# Patient Record
Sex: MALE | Race: WHITE | URBAN - METROPOLITAN AREA
[De-identification: names, ages, dates, MRNs, and addresses within clinical notes are randomized per-mention and may not be internally consistent; named-entity substitution may affect disease eponyms.]

---

## 2017-09-07 PROBLEM — N41.1 PROSTATITIS, CHRONIC: Status: ACTIVE | Noted: 2017-09-07

## 2017-11-03 ENCOUNTER — OFFICE VISIT (OUTPATIENT)
Dept: CARDIOLOGY CLINIC | Age: 77
End: 2017-11-03

## 2017-11-03 VITALS
WEIGHT: 185 LBS | BODY MASS INDEX: 25.06 KG/M2 | SYSTOLIC BLOOD PRESSURE: 119 MMHG | HEART RATE: 72 BPM | DIASTOLIC BLOOD PRESSURE: 67 MMHG | HEIGHT: 72 IN

## 2017-11-03 DIAGNOSIS — R60.9 EDEMA, UNSPECIFIED TYPE: ICD-10-CM

## 2017-11-03 DIAGNOSIS — I25.2 HISTORY OF MI (MYOCARDIAL INFARCTION): ICD-10-CM

## 2017-11-03 DIAGNOSIS — E78.5 HYPERLIPIDEMIA, UNSPECIFIED HYPERLIPIDEMIA TYPE: ICD-10-CM

## 2017-11-03 DIAGNOSIS — Z95.5 HISTORY OF CORONARY ARTERY STENT PLACEMENT: ICD-10-CM

## 2017-11-03 DIAGNOSIS — I25.10 CORONARY ARTERY DISEASE INVOLVING NATIVE CORONARY ARTERY OF NATIVE HEART WITHOUT ANGINA PECTORIS: Primary | ICD-10-CM

## 2017-11-03 DIAGNOSIS — E03.9 HYPOTHYROIDISM, UNSPECIFIED TYPE: ICD-10-CM

## 2017-11-03 NOTE — MR AVS SNAPSHOT
Visit Information Date & Time Provider Department Dept. Phone Encounter #  
 11/3/2017 11:45 AM Baldemar Miller MD [facility] [phone] Follow-up Instructions Return in about 2 months (around 1/3/2018), or if symptoms worsen or fail to improve, for post test.  
  
Your Appointments 4/16/2018  9:30 AM  
Nurse Visit with [email or web address] [address] [place] [address] Drive ([address]) Appt Note: PSA  
 [address] [facility] [address] [address]  
  
    
 4/30/2018  9:45 AM  
ESTABLISHED PATIENT with Belia Hendrix MD  
[facility] of [facility] [address]) Appt Note: 6 MTHS FLOW PVR UA PSA PRIOR  
 [facility] [address] [facility] Upcoming Health Maintenance Date Due DTaP/Tdap/Td series (1 - Tdap) 6/6/1961 ZOSTER VACCINE AGE 60> 4/6/2000 GLAUCOMA SCREENING Q2Y 6/6/2005 MEDICARE YEARLY EXAM 6/6/2005 INFLUENZA AGE 9 TO ADULT 8/1/2017 Pneumococcal 65+ High/Highest Risk (2 of 2 - PPSV23) 11/2/2017 Allergies as of 11/3/2017  Review Complete On: 11/3/2017 By: Baldemar Miller MD  
 No Known Allergies Current Immunizations  Never Reviewed No immunizations on file. Not reviewed this visit You Were Diagnosed With   
  
 Codes Comments Coronary artery disease involving native coronary artery of native heart without angina pectoris    -  Primary ICD-10-CM: I25.10 ICD-9-CM: 414.01 per pt stress test good in [facility] in  fall(usually october) of 2016 
 
get report of stress test from [facility] or PCP Edema, unspecified type     ICD-10-CM: R60.9 ICD-9-CM: 782.3 b/l legs- chk DVT, ECHO Teds if DVT neg Hypothyroidism, unspecified type     ICD-10-CM: E03.9 ICD-9-CM: 244.9 F/u PCP History of coronary artery stent placement     ICD-10-CM: Z95.5 ICD-9-CM: V45.82  d/c plavix, continue asa;  
2008 approx History of MI (myocardial infarction)     ICD-10-CM: I25.2 ICD-9-CM: 751  in [facility] Hyperlipidemia, unspecified hyperlipidemia type     ICD-10-CM: E78.5 ICD-9-CM: 272.4 Vitals BP Pulse Height(growth percentile) Weight(growth percentile) BMI Smoking Status 119/67 72 6' (1.829 m) 185 lb (83.9 kg) 25.09 kg/m2 Former Smoker Vitals History BMI and BSA Data Body Mass Index Body Surface Area 25.09 kg/m 2 2.06 m 2 Preferred Pharmacy Pharmacy Name Phone [address] [facility], [address] [facility] [phone] Your Updated Medication List  
  
   
This list is accurate as of: 11/3/17 12:35 PM.  Always use your most recent med list.  
  
  
  
  
 aspirin delayed-release 81 mg tablet Take 81 mg by mouth. atorvastatin 40 mg tablet Commonly known as:  LIPITOR Take 40 mg by mouth. Comp Stocking,Knee,Regular,Sml Misc 2 Box by Does Not Apply route daily. Use while upright  Indications: edema  
  
 docusate sodium 100 mg capsule Commonly known as:  Ethyl Hoit Take 100 mg by mouth.  
  
 levothyroxine 50 mcg tablet Commonly known as:  SYNTHROID Take 1 Tab by mouth.  
  
 losartan 50 mg tablet Commonly known as:  COZAAR TK 1 T PO  ONCE D  
  
 omeprazole 40 mg capsule Commonly known as:  PRILOSEC TK ONE C PO  D Prescriptions Sent to Pharmacy Refills Comp Stocking,Knee,Regular,Sml misc 0 Si Box by Does Not Apply route daily. Use while upright  Indications: edema Class: Normal  
 Pharmacy: At [facility] [address] [facility] Ph #: [phone] Route: Does Not Apply We Performed the Following AMB POC EKG ROUTINE W/ 12 LEADS, INTER & REP [37695 CPT(R)] Follow-up Instructions Return in about 2 months (around 1/3/2018), or if symptoms worsen or fail to improve, for post test.  
  
To-Do List   
 Around 2017 Lab:  HEPATIC FUNCTION PANEL Around 2017 Lab:  LIPID PANEL Around 2017 Cardiac Services:  2D ECHO COMPLETE ADULT (TTE)   
  
 11/10/2017 Imaging:  DUPLEX LOWER EXT VENOUS RIGHT Patient Instructions Medications Discontinued During This Encounter Medication Reason  ibuprofen (MOTRIN) 600 mg tablet Not A Current Medication  clopidogrel (PLAVIX) 75 mg tab Therapy Completed Orders Placed This Encounter  DUPLEX LOWER EXT VENOUS RIGHT Standing Status:   Future Standing Expiration Date:   12/3/2018  LIPID PANEL Standing Status:   Future Standing Expiration Date:   2/3/2018  
 HEPATIC FUNCTION PANEL Standing Status:   Future Standing Expiration Date:   2/3/2018  2D ECHO COMPLETE ADULT (TTE) Standing Status:   Future Standing Expiration Date:   2018 Order Specific Question:   Reason for Exam: Answer:   edema,  
 AMB POC EKG ROUTINE W/ 12 LEADS, INTER & REP Order Specific Question:   Reason for Exam: Answer:   hypertension  Comp Stocking,Knee,Regular,Sml misc Si Box by Does Not Apply route daily. Use while upright  Indications: edema Dispense:  2 Box Refill:  0 Leg and Ankle Edema: Care Instructions Your Care Instructions Swelling in the legs, ankles, and feet is called edema. It is common after you sit or stand for a while. Long plane flights or car rides often cause swelling in the legs and feet. You may also have swelling if you have to stand for long periods of time at your job. Problems with the veins in the legs (varicose veins) and changes in hormones can also cause swelling.  Sometimes the swelling in the ankles and feet is caused by a more serious problem, such as heart failure, infection, blood clots, or liver or kidney disease. Follow-up care is a key part of your treatment and safety. Be sure to make and go to all appointments, and call your doctor if you are having problems. It's also a good idea to know your test results and keep a list of the medicines you take. How can you care for yourself at home? · If your doctor gave you medicine, take it as prescribed. Call your doctor if you think you are having a problem with your medicine. · Whenever you are resting, raise your legs up. Try to keep the swollen area higher than the level of your heart. · Take breaks from standing or sitting in one position. ¨ Walk around to increase the blood flow in your lower legs. ¨ Move your feet and ankles often while you stand, or tighten and relax your leg muscles. · Wear support stockings. Put them on in the morning, before swelling gets worse. · Eat a balanced diet. Lose weight if you need to. · Limit the amount of salt (sodium) in your diet. Salt holds fluid in the body and may increase swelling. When should you call for help? Call 911 anytime you think you may need emergency care. For example, call if: 
? · You have symptoms of a blood clot in your lung (called a pulmonary embolism). These may include: 
¨ Sudden chest pain. ¨ Trouble breathing. ¨ Coughing up blood. ?Call your doctor now or seek immediate medical care if: 
? · You have signs of a blood clot, such as: 
¨ Pain in your calf, back of the knee, thigh, or groin. ¨ Redness and swelling in your leg or groin. ? · You have symptoms of infection, such as: 
¨ Increased pain, swelling, warmth, or redness. ¨ Red streaks or pus. ¨ A fever. ? Watch closely for changes in your health, and be sure to contact your doctor if: 
? · Your swelling is getting worse. ? · You have new or worsening pain in your legs. ? · You do not get better as expected. Where can you learn more? Go to http://gordo-parish.info/. Enter O546 in the search box to learn more about \"Leg and Ankle Edema: Care Instructions. \" Current as of: March 20, 2017 Content Version: 11.4 © 8664-6890 [employer]. Care instructions adapted under license by [employer] (which disclaims liability or warranty for this information). If you have questions about a medical condition or this instruction, always ask your healthcare professional. [address] any warranty or liability for your use of this information. Introducing Joss Gonzales! Gerson Zhang introduces [employer] patient portal. Now you can access parts of your medical record, email your doctor's office, and request medication refills online. 1. In your internet browser, go to [email or web address] 2. Click on the First Time User? Click Here link in the Sign In box. You will see the New Member Sign Up page. 3. Enter your [employer] Access Code exactly as it appears below. You will not need to use this code after youve completed the sign-up process. If you do not sign up before the expiration date, you must request a new code. · [employer] Access Code: [email or web address] Expires: 12/6/2017  3:45 PM 
 
4. Enter the last four digits of your Social Security Number (xxxx) and Date of Birth (mm/dd/yyyy) as indicated and click Submit. You will be taken to the next sign-up page. 5. Create a [employer] ID. This will be your [employer] login ID and cannot be changed, so think of one that is secure and easy to remember. 6. Create a [employer] password. You can change your password at any time. 7. Enter your Password Reset Question and Answer. This can be used at a later time if you forget your password. 8. Enter your e-mail address. You will receive e-mail notification when new information is available in [address] 9. Click Sign Up. You can now view and download portions of your medical record. 10. Click the Download Summary menu link to download a portable copy of your medical information. If you have questions, please visit the Frequently Asked Questions section of the [employer]e website. Remember, [employer] is NOT to be used for urgent needs. For medical emergencies, dial 911. Now available from your iPhone and Android! Please provide this summary of care documentation to your next provider. Your primary care clinician is listed as Vladislav Griffiths. If you have any questions after today's visit, please call [phone].

## 2017-11-03 NOTE — LETTER
Gus Conradapril 
1940 
 
11/3/2017 Dear Cindi Alas MD 
 
I had the pleasure of evaluating  Mr. Bhumika Joy in office today. Below are the relevant portions of my assessment and plan of care. ICD-10-CM ICD-9-CM 1. 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  
 per pt stress test good in [place] in  fall(usually october) of 2016 
 
get report of stress test from [place] or PCP 2. Edema, unspecified type R60.9 782.3 2D ECHO COMPLETE ADULT (TTE) DUPLEX LOWER EXT VENOUS RIGHT Comp Stocking,Knee,Regular,Sml misc  
 b/l legs- chk DVT, ECHO Teds if DVT neg 3. Hypothyroidism, unspecified type E03.9 244.9 F/u PCP 4. History of coronary artery stent placement Z95.5 V45.82 2D ECHO COMPLETE ADULT (TTE)  
 11/17 d/c plavix, continue asa;  
2008 approx 5. History of MI (myocardial infarction) I25.2 12 2D ECHO COMPLETE ADULT (TTE) 2007 in [place]  
6. Hyperlipidemia, unspecified hyperlipidemia type E78.5 272.4 LIPID PANEL  
   HEPATIC FUNCTION PANEL Current Outpatient Prescriptions Medication Sig Dispense Refill  Comp Stocking,Knee,Regular,Sml misc 2 Box by Does Not Apply route daily. Use while upright  Indications: edema 2 Box 0  
 levothyroxine (SYNTHROID) 50 mcg tablet Take 1 Tab by mouth.  omeprazole (PRILOSEC) 40 mg capsule TK ONE C PO  D  3  
 atorvastatin (LIPITOR) 40 mg tablet Take 40 mg by mouth.  losartan (COZAAR) 50 mg tablet TK 1 T PO  ONCE D  3  
 aspirin delayed-release 81 mg tablet Take 81 mg by mouth.  docusate sodium (COLACE) 100 mg capsule Take 100 mg by mouth. Orders Placed This Encounter  DUPLEX LOWER EXT VENOUS RIGHT Standing Status:   Future Standing Expiration Date:   12/3/2018  LIPID PANEL Standing Status:   Future   Standing Expiration Date:   2/3/2018  
 HEPATIC FUNCTION PANEL  
 Standing Status:   Future Standing Expiration Date:   2/3/2018  2D ECHO COMPLETE ADULT (TTE) Standing Status:   Future Standing Expiration Date:   2018 Order Specific Question:   Reason for Exam: Answer:   edema,  
 AMB POC EKG ROUTINE W/ 12 LEADS, INTER & REP Order Specific Question:   Reason for Exam: Answer:   hypertension  Comp Stocking,Knee,Regular,Sml misc Si Box by Does Not Apply route daily. Use while upright  Indications: edema Dispense:  2 Box Refill:  0 If you have questions, please do not hesitate to call me. I look forward to following Mr. Jarrett Pearson along with you. Sincerely, Cherrie Daniels MD

## 2017-11-03 NOTE — PROGRESS NOTES
HISTORY OF PRESENT ILLNESS  Sanford Weaver is a 68 y.o. male. HPI Comments: Patient denies significant chest pain, SOB, palpitations, dizziness      New Patient   The history is provided by the patient. Pertinent negatives include no chest pain, no headaches and no shortness of breath. Cholesterol Problem   The history is provided by the medical records and patient. Pertinent negatives include no chest pain, no headaches and no shortness of breath. Hypertension   The history is provided by the medical records and patient. Pertinent negatives include no chest pain, no headaches and no shortness of breath. Thyroid Problem   The history is provided by the patient. Pertinent negatives include no chest pain, no headaches and no shortness of breath. Leg Swelling   The history is provided by the patient. This is a chronic problem. The current episode started more than 1 week ago. The problem occurs daily. Pertinent negatives include no chest pain, no headaches and no shortness of breath. The symptoms are aggravated by standing. The symptoms are relieved by sleep. Review of Systems   Constitutional: Negative for chills, fever, malaise/fatigue and weight loss. HENT: Negative for nosebleeds. Eyes: Negative for discharge. Respiratory: Negative for cough, shortness of breath and wheezing. Cardiovascular: Positive for leg swelling. Negative for chest pain, palpitations, orthopnea, claudication and PND. Gastrointestinal: Negative for diarrhea, nausea and vomiting. Genitourinary: Negative for dysuria and hematuria. Musculoskeletal: Negative for joint pain. Skin: Negative for rash. Neurological: Negative for dizziness, seizures, loss of consciousness and headaches. Endo/Heme/Allergies: Negative for polydipsia. Does not bruise/bleed easily. Psychiatric/Behavioral: Negative for depression and substance abuse. The patient does not have insomnia.       No Known Allergies    Past Medical History:   Diagnosis Date    Cerebrovascular accident (CVA) ([facility] [address] late 80s    TIA    Coronary arteriosclerosis     Essential hypertension     Gastroesophageal reflux disease     Heart disease     Hyperlipidemia     Hypothyroid     Myocardial infarction     Prostate cancer ([facility] [address]     1 core vahid 6- pt currently on AS    Renal disease     Renal stones        Family History   Problem Relation Age of Onset    Heart Attack Neg Hx     Stroke Neg Hx        Social History   Substance Use Topics    Smoking status: Former Smoker     Types: Cigarettes     Quit date: 9/7/2006    Smokeless tobacco: Never Used    Alcohol use No        Current Outpatient Prescriptions   Medication Sig    levothyroxine (SYNTHROID) 50 mcg tablet Take 1 Tab by mouth.  omeprazole (PRILOSEC) 40 mg capsule TK ONE C PO  D    atorvastatin (LIPITOR) 40 mg tablet Take 40 mg by mouth.  losartan (COZAAR) 50 mg tablet TK 1 T PO  ONCE D    aspirin delayed-release 81 mg tablet Take 81 mg by mouth.  docusate sodium (COLACE) 100 mg capsule Take 100 mg by mouth. No current facility-administered medications for this visit. Past Surgical History:   Procedure Laterality Date    HX CORONARY STENT PLACEMENT  2007    HX HEART CATHETERIZATION      HX HERNIA REPAIR      HX OTHER SURGICAL  90s    b/l renal stents    HX UROLOGICAL      cystoscopy with bilateral stent placement       Visit Vitals    /67    Pulse 72    Ht 6' (1.829 m)    Wt 83.9 kg (185 lb)    BMI 25.09 kg/m2       Diagnostic Studies:  I have reviewed the relevant tests done on the patient and show as follows  EKG tracings reviewed by me today. No flowsheet data found. Mr. Cayla Ely has a reminder for a \"due or due soon\" health maintenance. I have asked that he contact his primary care provider for follow-up on this health maintenance. Physical Exam   Constitutional: He is oriented to person, place, and time.  He appears well-developed and well-nourished. No distress. HENT:   Head: Normocephalic and atraumatic. Mouth/Throat: Normal dentition. Eyes: Right eye exhibits no discharge. Left eye exhibits no discharge. No scleral icterus. Neck: Neck supple. No JVD present. Carotid bruit is not present. No thyromegaly present. Cardiovascular: Normal rate, regular rhythm, S1 normal, S2 normal, normal heart sounds and intact distal pulses. Exam reveals decreased pulses. Exam reveals no gallop and no friction rub. No murmur heard. Pulmonary/Chest: Effort normal and breath sounds normal. He has no wheezes. He has no rales. Abdominal: Soft. He exhibits no mass. There is no tenderness. Musculoskeletal: He exhibits edema (1+). Lymphadenopathy:        Right cervical: No superficial cervical adenopathy present. Left cervical: No superficial cervical adenopathy present. Neurological: He is alert and oriented to person, place, and time. Skin: Skin is warm and dry. No rash noted. Psychiatric: He has a normal mood and affect. His behavior is normal.       ASSESSMENT and PLAN      Diagnoses and all orders for this visit:    1. Coronary artery disease involving native coronary artery of native heart without angina pectoris  Comments:  per pt stress test good in [place] in  fall(usually october) of 2016    get report of stress test from [place] or PCP  Orders:  -     AMB POC EKG ROUTINE W/ 12 LEADS, INTER & REP    2. Edema, unspecified type  Comments:  b/l legs- chk DVT, ECHO  Teds if DVT neg  Orders:  -     2D ECHO COMPLETE ADULT (TTE); Future  -     DUPLEX LOWER EXT VENOUS RIGHT; Future  -     Comp Stocking,Knee,Regular,Sml misc; 2 Box by Does Not Apply route daily. Use while upright  Indications: edema    3. Hypothyroidism, unspecified type  Comments:  F/u PCP      4. History of coronary artery stent placement  Comments:  11/17 d/c plavix, continue asa;   2008 approx  Orders:  -     2D ECHO COMPLETE ADULT (TTE); Future    5.  History of MI (myocardial infarction)  Comments:  2007 in [place]  Orders:  -     2D ECHO COMPLETE ADULT (TTE); Future    6. Hyperlipidemia, unspecified hyperlipidemia type  -     LIPID PANEL; Future  -     HEPATIC FUNCTION PANEL; Future        Pertinent laboratory and test data reviewed and discussed with patient.   See patient instructions also for other medical advice given    Medications Discontinued During This Encounter   Medication Reason    ibuprofen (MOTRIN) 600 mg tablet Not A Current Medication    clopidogrel (PLAVIX) 75 mg tab Therapy Completed       Follow-up Disposition:  Return in about 2 months (around 1/3/2018), or if symptoms worsen or fail to improve, for post test.

## 2017-11-03 NOTE — PATIENT INSTRUCTIONS
Medications Discontinued During This Encounter   Medication Reason    ibuprofen (MOTRIN) 600 mg tablet Not A Current Medication    clopidogrel (PLAVIX) 75 mg tab Therapy Completed       Orders Placed This Encounter    DUPLEX LOWER EXT VENOUS RIGHT     Standing Status:   Future     Standing Expiration Date:   12/3/2018    LIPID PANEL     Standing Status:   Future     Standing Expiration Date:   2/3/2018    HEPATIC FUNCTION PANEL     Standing Status:   Future     Standing Expiration Date:   2/3/2018    2D ECHO COMPLETE ADULT (TTE)     Standing Status:   Future     Standing Expiration Date:   2018     Order Specific Question:   Reason for Exam:     Answer:   edema,    AMB POC EKG ROUTINE W/ 12 LEADS, INTER & REP     Order Specific Question:   Reason for Exam:     Answer:   hypertension    Comp Stocking,Knee,Regular,Sml misc     Si Box by Does Not Apply route daily. Use while upright  Indications: edema     Dispense:  2 Box     Refill:  0          Leg and Ankle Edema: Care Instructions  Your Care Instructions  Swelling in the legs, ankles, and feet is called edema. It is common after you sit or stand for a while. Long plane flights or car rides often cause swelling in the legs and feet. You may also have swelling if you have to stand for long periods of time at your job. Problems with the veins in the legs (varicose veins) and changes in hormones can also cause swelling. Sometimes the swelling in the ankles and feet is caused by a more serious problem, such as heart failure, infection, blood clots, or liver or kidney disease. Follow-up care is a key part of your treatment and safety. Be sure to make and go to all appointments, and call your doctor if you are having problems. It's also a good idea to know your test results and keep a list of the medicines you take. How can you care for yourself at home? · If your doctor gave you medicine, take it as prescribed.  Call your doctor if you think you are having a problem with your medicine. · Whenever you are resting, raise your legs up. Try to keep the swollen area higher than the level of your heart. · Take breaks from standing or sitting in one position. ¨ Walk around to increase the blood flow in your lower legs. ¨ Move your feet and ankles often while you stand, or tighten and relax your leg muscles. · Wear support stockings. Put them on in the morning, before swelling gets worse. · Eat a balanced diet. Lose weight if you need to. · Limit the amount of salt (sodium) in your diet. Salt holds fluid in the body and may increase swelling. When should you call for help? Call 911 anytime you think you may need emergency care. For example, call if:  ? · You have symptoms of a blood clot in your lung (called a pulmonary embolism). These may include:  ¨ Sudden chest pain. ¨ Trouble breathing. ¨ Coughing up blood. ?Call your doctor now or seek immediate medical care if:  ? · You have signs of a blood clot, such as:  ¨ Pain in your calf, back of the knee, thigh, or groin. ¨ Redness and swelling in your leg or groin. ? · You have symptoms of infection, such as:  ¨ Increased pain, swelling, warmth, or redness. ¨ Red streaks or pus. ¨ A fever. ? Watch closely for changes in your health, and be sure to contact your doctor if:  ? · Your swelling is getting worse. ? · You have new or worsening pain in your legs. ? · You do not get better as expected. Where can you learn more? Go to http://gordo-parish.info/. Enter F867 in the search box to learn more about \"Leg and Ankle Edema: Care Instructions. \"  Current as of: March 20, 2017  Content Version: 11.4  © 4857-5628 [employer]. Care instructions adapted under license by el? (which disclaims liability or warranty for this information).  If you have questions about a medical condition or this instruction, always ask your healthcare professional. opinions.h, [employer]orporated disclaims any warranty or liability for your use of this information. Patient is scheduled to have a Venous doppler at [place] [address] on 11/21/17 @ 8:30.

## 2017-11-28 ENCOUNTER — TELEPHONE (OUTPATIENT)
Dept: CARDIOLOGY CLINIC | Age: 77
End: 2017-11-28

## 2017-11-28 DIAGNOSIS — R60.9 EDEMA, UNSPECIFIED TYPE: ICD-10-CM

## 2017-11-28 NOTE — TELEPHONE ENCOUNTER
Dr. Martha Deluca called to have patient cleared for Ureteroscopy with general anesthesia with Dr. Liv Lambert on 12/6/17.  Please advise

## 2017-11-28 NOTE — TELEPHONE ENCOUNTER
Get echo asap  If last stress test not available from [place], get a new nuclear stress test also

## 2017-11-29 NOTE — TELEPHONE ENCOUNTER
Can't say confidently his surgical risks, as pt is new to me and has had CAD and remote PCI. He is refusing testing for CAD evaluation. However he was hemodynamically stable when sen as new patient for the first time. Patient as well as surgeon will need to understand that while going through [place].

## 2017-12-05 NOTE — TELEPHONE ENCOUNTER
Dr. Graciela Benitez called to discuss notes regarding surgical clearance. Surgery has been cancelled. He voices understanding and acceptance of this advice and will call back if any further questions or concerns.

## 2017-12-11 ENCOUNTER — TELEPHONE (OUTPATIENT)
Dept: CARDIOLOGY CLINIC | Age: 77
End: 2017-12-11

## 2017-12-11 NOTE — TELEPHONE ENCOUNTER
If swelling is significant, patient may go to emergency room or urgent care center. Otherwise bring him for an office visit for appropriate decision.

## 2017-12-11 NOTE — TELEPHONE ENCOUNTER
----- Message from Magdaleno Goodpasture sent at 12/11/2017  9:03 AM EST -----  Regarding: LEG SWELLING/PAIN   Contact: [phone]  Patient would like a return call concerning the leg swelling and pain; have questions.

## 2017-12-11 NOTE — TELEPHONE ENCOUNTER
Patient's friend tam to discuss bilateral leg swelling with pain, swelling continues even with compression stockings on, pt has no shortness of breath and does not weigh daily. Next office visit 1/2018. Please advise.

## 2017-12-11 NOTE — TELEPHONE ENCOUNTER
Patient called and spoke with friend, he will see Dr. Zeb Chanel on 12/26/17 at [address] [place] [address] for leg swelling. She voices understanding and acceptance of this advice and will call back if any further questions or concerns.

## 2017-12-26 ENCOUNTER — OFFICE VISIT (OUTPATIENT)
Dept: CARDIOLOGY CLINIC | Age: 77
End: 2017-12-26

## 2017-12-26 VITALS
WEIGHT: 194 LBS | BODY MASS INDEX: 26.28 KG/M2 | SYSTOLIC BLOOD PRESSURE: 129 MMHG | DIASTOLIC BLOOD PRESSURE: 68 MMHG | HEART RATE: 75 BPM | HEIGHT: 72 IN

## 2017-12-26 DIAGNOSIS — I50.9 ACUTE ON CHRONIC CONGESTIVE HEART FAILURE, UNSPECIFIED CONGESTIVE HEART FAILURE TYPE: Primary | ICD-10-CM

## 2017-12-26 DIAGNOSIS — I25.2 HISTORY OF MI (MYOCARDIAL INFARCTION): ICD-10-CM

## 2017-12-26 DIAGNOSIS — I10 ESSENTIAL HYPERTENSION: ICD-10-CM

## 2017-12-26 DIAGNOSIS — I25.10 CORONARY ARTERIOSCLEROSIS: ICD-10-CM

## 2017-12-26 DIAGNOSIS — Z95.5 HISTORY OF CORONARY ARTERY STENT PLACEMENT: ICD-10-CM

## 2017-12-26 RX ORDER — FUROSEMIDE 40 MG/1
40 TABLET ORAL
Qty: 30 TAB | Refills: 1 | Status: SHIPPED | OUTPATIENT
Start: 2017-12-26 | End: 2018-01-08 | Stop reason: SDUPTHER

## 2017-12-26 RX ORDER — LOSARTAN POTASSIUM 50 MG/1
25 TABLET ORAL DAILY
Qty: 30 TAB | Refills: 3
Start: 2017-12-26 | End: 2018-01-08 | Stop reason: SDUPTHER

## 2017-12-26 NOTE — PATIENT INSTRUCTIONS
Medications Discontinued During This Encounter   Medication Reason    losartan (COZAAR) 50 mg tablet Reorder       Orders Placed This Encounter    METABOLIC PANEL, BASIC     Standing Status:   Future     Standing Expiration Date:   3/28/2018    SCHEDULE NUCLEAR STUDY     exercise    2D ECHO COMPLETE ADULT (TTE)     Standing Status:   Future     Standing Expiration Date:   6/24/2018     Order Specific Question:   Reason for Exam:     Answer:   CHF    furosemide (LASIX) 40 mg tablet     Sig: Take 1 Tab by mouth daily as needed. Dispense:  30 Tab     Refill:  1    losartan (COZAAR) 50 mg tablet     Sig: Take 0.5 Tabs by mouth daily. Indications: Chronic Heart Failure     Dispense:  30 Tab     Refill:  3          Avoiding Triggers With Heart Failure: Care Instructions  Your Care Instructions    Triggers are anything that make your heart failure flare up. A flare-up is also called \"sudden heart failure\" or \"acute heart failure. \" When you have a flare-up, fluid builds up in your lungs, and you have problems breathing. You might need to go to the hospital. By watching for changes in your condition and avoiding triggers, you can prevent heart failure flare-ups. Follow-up care is a key part of your treatment and safety. Be sure to make and go to all appointments, and call your doctor if you are having problems. It's also a good idea to know your test results and keep a list of the medicines you take. How can you care for yourself at home? Watch for changes in your weight and condition  · Weigh yourself without clothing at the same time each day. Record your weight. Call your doctor if you have sudden weight gain, such as more than 2 to 3 pounds in a day or 5 pounds in a week. (Your doctor may suggest a different range of weight gain.) A sudden weight gain may mean that your heart failure is getting worse. · Keep a daily record of your symptoms.  Write down any changes in how you feel, such as new shortness of breath, cough, or problems eating. Also record if your ankles are more swollen than usual and if you feel more tired than usual. Note anything that you ate or did that could have triggered these changes. Limit sodium  Sodium causes your body to hold on to extra water. This may cause your heart failure symptoms to get worse. People get most of their sodium from processed foods. Fast food and restaurant meals also tend to be very high in sodium. · Your doctor may suggest that you limit sodium to 2,000 milligrams (mg) a day or less. That is less than 1 teaspoon of salt a day, including all the salt you eat in cooking or in packaged foods. · Read food labels on cans and food packages. They tell you how much sodium you get in one serving. Check the serving size. If you eat more than one serving, you are getting more sodium. · Be aware that sodium can come in forms other than salt, including monosodium glutamate (MSG), sodium citrate, and sodium bicarbonate (baking soda). MSG is often added to Asian food. You can sometimes ask for food without MSG or salt. · Slowly reducing salt will help you adjust to the taste. Take the salt shaker off the table. · Flavor your food with garlic, lemon juice, onion, vinegar, herbs, and spices instead of salt. Do not use soy sauce, steak sauce, onion salt, garlic salt, mustard, or ketchup on your food, unless it is labeled \"low-sodium\" or \"low-salt. \"  · Make your own salad dressings, sauces, and ketchup without adding salt. · Use fresh or frozen ingredients, instead of canned ones, whenever you can. Choose low-sodium canned goods. · Eat less processed food and food from restaurants, including fast food. Exercise as directed  Moderate, regular exercise is very good for your heart. It improves your blood flow and helps control your weight. But too much exercise can stress your heart and cause a heart failure flare-up.   · Check with your doctor before you start an exercise program.  · Walking is an easy way to get exercise. Start out slowly. Gradually increase the length and pace of your walk. Swimming, riding a bike, and using a treadmill are also good forms of exercise. · When you exercise, watch for signs that your heart is working too hard. You are pushing yourself too hard if you cannot talk while you are exercising. If you become short of breath or dizzy or have chest pain, stop, sit down, and rest.  · Do not exercise when you do not feel well. Take medicines correctly  · Take your medicines exactly as prescribed. Call your doctor if you think you are having a problem with your medicine. · Make a list of all the medicines you take. Include those prescribed to you by other doctors and any over-the-counter medicines, vitamins, or supplements you take. Take this list with you when you go to any doctor. · Take your medicines at the same time every day. It may help you to post a list of all the medicines you take every day and what time of day you take them. · Make taking your medicine as simple as you can. Plan times to take your medicines when you are doing other things, such as eating a meal or getting ready for bed. This will make it easier to remember to take your medicines. · Get organized. Use helpful tools, such as daily or weekly pill containers. When should you call for help? Call 911 if you have symptoms of sudden heart failure such as:  ? · You have severe trouble breathing. ? · You cough up pink, foamy mucus. ? · You have a new irregular or rapid heartbeat. ?Call your doctor now or seek immediate medical care if:  ? · You have new or increased shortness of breath. ? · You are dizzy or lightheaded, or you feel like you may faint. ? · You have sudden weight gain, such as more than 2 to 3 pounds in a day or 5 pounds in a week. (Your doctor may suggest a different range of weight gain.)   ? · You have increased swelling in your legs, ankles, or feet. ? · You are suddenly so tired or weak that you cannot do your usual activities. ? Watch closely for changes in your health, and be sure to contact your doctor if you develop new symptoms. Where can you learn more? Go to http://[facility].info/. Enter G857 in the search box to learn more about \"Avoiding Triggers With Heart Failure: Care Instructions. \"  Current as of: September 21, 2016  Content Version: 11.4  © 2446-7752 "[employer]". Care instructions adapted under license by [employer] (which disclaims liability or warranty for this information). If you have questions about a medical condition or this instruction, always ask your healthcare professional. [address] any warranty or liability for your use of this information.

## 2017-12-26 NOTE — PROGRESS NOTES
1. Have you been to the ER, urgent care clinic since your last visit? Hospitalized since your last visit? No    2. Have you seen or consulted any other health care providers outside of the [address] [place] [address] since your last visit? Include any pap smears or colon screening. Yes Where: PCP     3. Since your last visit, have you had any of the following symptoms? .           4. Have you had any blood work, X-rays or cardiac testing? No            5.  Where do you normally have your labs drawn? PCP Office    6. Do you need any refills today?    No

## 2017-12-26 NOTE — PROGRESS NOTES
HISTORY OF PRESENT ILLNESS  Karime Villatoro is a 68 y.o. male. HPI Comments: Patient denies significant chest pain, palpitations, dizziness      Leg Swelling   The history is provided by the patient. This is a chronic problem. The current episode started more than 1 week ago. The problem occurs daily. The problem has been gradually worsening (few weeks). Associated symptoms include shortness of breath. Pertinent negatives include no chest pain and no headaches. The symptoms are aggravated by standing. The symptoms are relieved by sleep. Cholesterol Problem   The history is provided by the medical records and patient. Associated symptoms include shortness of breath. Pertinent negatives include no chest pain and no headaches. Hypertension   The history is provided by the medical records and patient. Associated symptoms include shortness of breath. Pertinent negatives include no chest pain and no headaches. Shortness of Breath   The history is provided by the patient. This is a new problem. The problem occurs intermittently. The current episode started more than 1 week ago (11/17). Associated symptoms include leg swelling. Pertinent negatives include no fever, no headaches, no cough, no wheezing, no PND, no orthopnea, no chest pain, no vomiting, no rash and no claudication. The problem's precipitants include exercise (1 flight). Review of Systems   Constitutional: Negative for chills, fever, malaise/fatigue and weight loss. HENT: Negative for nosebleeds. Eyes: Negative for discharge. Respiratory: Positive for shortness of breath. Negative for cough and wheezing. Cardiovascular: Positive for leg swelling. Negative for chest pain, palpitations, orthopnea, claudication and PND. Gastrointestinal: Negative for diarrhea, nausea and vomiting. Genitourinary: Negative for dysuria and hematuria. Musculoskeletal: Negative for joint pain. Skin: Negative for rash.    Neurological: Negative for dizziness, seizures, loss of consciousness and headaches. Endo/Heme/Allergies: Negative for polydipsia. Does not bruise/bleed easily. Psychiatric/Behavioral: Negative for depression and substance abuse. The patient does not have insomnia. No Known Allergies    Past Medical History:   Diagnosis Date    [facility] late 80s    TIA    Coronary arteriosclerosis     Essential hypertension     Gastroesophageal reflux disease     Heart disease     Hyperlipidemia     Hypothyroid     Myocardial infarction     Prostate cancer ([facility]     1 core vahid 6- pt currently on AS    Renal disease     Renal stones        Family History   Problem Relation Age of Onset    Heart Attack Neg Hx     Stroke Neg Hx        Social History   Substance Use Topics    Smoking status: Former Smoker     Types: Cigarettes     Quit date: 9/7/2006    Smokeless tobacco: Never Used    Alcohol use No        Current Outpatient Prescriptions   Medication Sig    Comp Stocking,Knee,Regular,Sml misc 2 Box by Does Not Apply route daily. Use while upright  Indications: edema    levothyroxine (SYNTHROID) 50 mcg tablet Take 1 Tab by mouth.  omeprazole (PRILOSEC) 40 mg capsule TK ONE C PO  D    atorvastatin (LIPITOR) 40 mg tablet Take 40 mg by mouth.  losartan (COZAAR) 50 mg tablet TK 1 T PO  ONCE D    aspirin delayed-release 81 mg tablet Take 81 mg by mouth.  docusate sodium (COLACE) 100 mg capsule Take 100 mg by mouth. No current facility-administered medications for this visit.          Past Surgical History:   Procedure Laterality Date    HX CORONARY STENT PLACEMENT  2007    HX HEART CATHETERIZATION      HX HERNIA REPAIR      HX OTHER SURGICAL  90s    b/l renal stents    HX UROLOGICAL      cystoscopy with bilateral stent placement       Visit Vitals    /68    Pulse 75    Ht 6' (1.829 m)    Wt 194 lb (88 kg)    BMI 26.31 kg/m2       Diagnostic Studies:  I have reviewed the relevant tests done on the patient and show as follows  EKG tracings reviewed by me today. CARDIOLOGY STUDIES 11/21/2017   Doppler US Vascular Result DVT PVL negative   Some recent data might be hidden       Mr. Danni Rasheed has a reminder for a \"due or due soon\" health maintenance. I have asked that he contact his primary care provider for follow-up on this health maintenance. Physical Exam   Constitutional: He is oriented to person, place, and time. He appears well-developed and well-nourished. No distress. HENT:   Head: Normocephalic and atraumatic. Mouth/Throat: Normal dentition. Eyes: Right eye exhibits no discharge. Left eye exhibits no discharge. No scleral icterus. Neck: Neck supple. No JVD present. Carotid bruit is not present. No thyromegaly present. Cardiovascular: Normal rate, regular rhythm, S1 normal, S2 normal, normal heart sounds and intact distal pulses. Exam reveals decreased pulses. Exam reveals no gallop and no friction rub. No murmur heard. Pulmonary/Chest: Effort normal and breath sounds normal. He has no wheezes. He has no rales. Abdominal: Soft. He exhibits no mass. There is no tenderness. Musculoskeletal: He exhibits edema (2+). Lymphadenopathy:        Right cervical: No superficial cervical adenopathy present. Left cervical: No superficial cervical adenopathy present. Neurological: He is alert and oriented to person, place, and time. Skin: Skin is warm and dry. No rash noted. Psychiatric: He has a normal mood and affect. His behavior is normal.       ASSESSMENT and PLAN        Diagnoses and all orders for this visit:    1. Acute on chronic congestive heart failure, unspecified congestive heart failure type ([address]  Comments:  12/17 worsening edema; eval EF, ischemia  Orders:  -     2D ECHO COMPLETE ADULT (TTE); Future  -     SCHEDULE NUCLEAR STUDY  -     METABOLIC PANEL, BASIC; Future  -     furosemide (LASIX) 40 mg tablet; Take 1 Tab by mouth daily as needed.   - losartan (COZAAR) 50 mg tablet; Take 0.5 Tabs by mouth daily. Indications: Chronic Heart Failure    2. Coronary arteriosclerosis  Comments:  H/o PCI 2007      3. Essential hypertension  Comments:  controlled    Orders:  -     2D ECHO COMPLETE ADULT (TTE); Future  -     losartan (COZAAR) 50 mg tablet; Take 0.5 Tabs by mouth daily. Indications: Chronic Heart Failure    4. History of coronary artery stent placement    5. History of MI (myocardial infarction)        Pertinent laboratory and test data reviewed and discussed with patient.   See patient instructions also for other medical advice given    Medications Discontinued During This Encounter   Medication Reason    losartan (COZAAR) 50 mg tablet Reorder       Follow-up Disposition:  Return in about 2 weeks (around 1/9/2018), or if symptoms worsen or fail to improve, for same day post test.

## 2017-12-26 NOTE — LETTER
Yareli Richard 
1940 12/26/2017 Dear Elidia Whittington MD 
 
I had the pleasure of evaluating  Mr. Tiff Villatoro in office today. Below are the relevant portions of my assessment and plan of care. ICD-10-CM ICD-9-CM 1. Acute on chronic congestive heart failure, unspecified congestive heart failure type (HCC) I50.9 428.0 2D ECHO COMPLETE ADULT (TTE) SCHEDULE NUCLEAR STUDY METABOLIC PANEL, BASIC  
   furosemide (LASIX) 40 mg tablet  
   losartan (COZAAR) 50 mg tablet 12/17 worsening edema; eval EF, ischemia 2. Coronary arteriosclerosis I25.10 414.00 H/o PCI 2007 3. Essential hypertension I10 401.9 2D ECHO COMPLETE ADULT (TTE)  
   losartan (COZAAR) 50 mg tablet  
 controlled 4. History of coronary artery stent placement Z95.5 V45.82   
5. History of MI (myocardial infarction) I25.2 412 Current Outpatient Prescriptions Medication Sig Dispense Refill  furosemide (LASIX) 40 mg tablet Take 1 Tab by mouth daily as needed. 30 Tab 1  
 losartan (COZAAR) 50 mg tablet Take 0.5 Tabs by mouth daily. Indications: Chronic Heart Failure 30 Tab 3  
 Comp Stocking,Knee,Regular,Sml misc 2 Box by Does Not Apply route daily. Use while upright  Indications: edema 2 Box 0  
 levothyroxine (SYNTHROID) 50 mcg tablet Take 1 Tab by mouth.  omeprazole (PRILOSEC) 40 mg capsule TK ONE C PO  D  3  
 atorvastatin (LIPITOR) 40 mg tablet Take 40 mg by mouth.  aspirin delayed-release 81 mg tablet Take 81 mg by mouth.  docusate sodium (COLACE) 100 mg capsule Take 100 mg by mouth. Orders Placed This Encounter  METABOLIC PANEL, BASIC Standing Status:   Future Standing Expiration Date:   3/28/2018  SCHEDULE NUCLEAR STUDY  
  exercise  2D ECHO COMPLETE ADULT (TTE) Standing Status:   Future Standing Expiration Date:   6/24/2018 Order Specific Question:   Reason for Exam: Answer:   CHF  furosemide (LASIX) 40 mg tablet Sig: Take 1 Tab by mouth daily as needed. Dispense:  30 Tab Refill:  1  
 losartan (COZAAR) 50 mg tablet Sig: Take 0.5 Tabs by mouth daily. Indications: Chronic Heart Failure Dispense:  30 Tab Refill:  3 If you have questions, please do not hesitate to call me. I look forward to following Mr. Yaritza Powell along with you. Sincerely, Edson Ledezma MD

## 2017-12-26 NOTE — MR AVS SNAPSHOT
Visit Information Date & Time Provider Department Dept. Phone Encounter #  
 12/26/2017  8:45 AM Eve Deleon MD [facility] [id number] [phone] Follow-up Instructions Return in about 2 weeks (around 1/9/2018), or if symptoms worsen or fail to improve, for same day post test.  
  
Your Appointments 1/8/2018  8:00 AM  
PROCEDURE with CA NUC [facility] ([address] [place] [address]) Appt Note: Est/Echo/Ray/Same day follow up  
 [address] [facility] [address]  
  
   
 [address] [id number] [address] [address]  
  
    
 1/8/2018  8:30 AM  
PROCEDURE with CA ECHO [facility] ([address] [place] [address]) Appt Note: echo/est/same day follow up  
 [address] [facility] [address]  
  
   
 [address] [id number] [address] [address]  
  
    
 1/8/2018  9:30 AM  
ESTABLISHED PATIENT with Eve Deleon MD  
[facility] ([address] [place] [address]) Appt Note: 2 month post venous doppler/Lipid/LFT  
 [address] [facility] [address]  
  
   
 [address] [id number] [address] [address]  
  
    
 4/16/2018  9:30 AM  
Nurse Visit with [facility] [address] [place] [address] ([address] [place] [address]) Appt Note: PSA  
 2000 [facility] [address] [address]  
  
    
 4/30/2018  9:45 AM  
ESTABLISHED PATIENT with Unique Arenas MD  
[facility] of [address] [place] [address] Drive [address] [place] [address]) Appt Note: 6 MTHS FLOW PVR UA PSA PRIOR  
 2000 [facility] [address] Upcoming Health Maintenance Date Due DTaP/Tdap/Td series (1 - Tdap) 6/6/1961 ZOSTER VACCINE AGE 60> 4/6/2000 GLAUCOMA SCREENING Q2Y 6/6/2005 MEDICARE YEARLY EXAM 6/6/2005 Influenza Age 5 to Adult 8/1/2017 Pneumococcal 65+ High/Highest Risk (2 of 2 - PPSV23) 11/2/2017 Allergies as of 12/26/2017  Review Complete On: 12/26/2017 By: Bettye Osullivan MD  
 No Known Allergies Current Immunizations  Never Reviewed No immunizations on file. Not reviewed this visit You Were Diagnosed With   
  
 Codes Comments Acute on chronic congestive heart failure, unspecified congestive heart failure type ([facility] [address]    -  Primary ICD-10-CM: I50.9 ICD-9-CM: 428.0 12/17 worsening edema; eval EF, ischemia Coronary arteriosclerosis     ICD-10-CM: I25.10 ICD-9-CM: 414.00 H/o PCI 2007 Essential hypertension     ICD-10-CM: I10 
ICD-9-CM: 401.9 controlled History of coronary artery stent placement     ICD-10-CM: Z95.5 ICD-9-CM: V45.82 History of MI (myocardial infarction)     ICD-10-CM: I25.2 ICD-9-CM: 284 Vitals BP Pulse Height(growth percentile) Weight(growth percentile) BMI Smoking Status 129/68 75 6' (1.829 m) 194 lb (88 kg) 26.31 kg/m2 Former Smoker Vitals History BMI and BSA Data Body Mass Index Body Surface Area  
 26.31 kg/m 2 2.11 m 2 Preferred Pharmacy Pharmacy Name Phone [facility], [address] [place] [address] [phone] Your Updated Medication List  
  
   
This list is accurate as of: 12/26/17  9:50 AM.  Always use your most recent med list.  
  
  
  
  
 aspirin delayed-release 81 mg tablet Take 81 mg by mouth. atorvastatin 40 mg tablet Commonly known as:  LIPITOR Take 40 mg by mouth. Comp Stocking,Knee,Regular,Sml Misc 2 Box by Does Not Apply route daily. Use while upright  Indications: edema  
  
 docusate sodium 100 mg capsule Commonly known as:  Maretta Florian Take 100 mg by mouth. furosemide 40 mg tablet Commonly known as:  LASIX Take 1 Tab by mouth daily as needed. levothyroxine 50 mcg tablet Commonly known as:  SYNTHROID Take 1 Tab by mouth.  
  
 losartan 50 mg tablet Commonly known as:  COZAAR  
 Take 0.5 Tabs by mouth daily. Indications: Chronic Heart Failure  
  
 omeprazole 40 mg capsule Commonly known as:  PRILOSEC TK ONE C PO  D Prescriptions Sent to Pharmacy Refills  
 furosemide (LASIX) 40 mg tablet 1 Sig: Take 1 Tab by mouth daily as needed. Class: Normal  
 Pharmacy: [facility] [address] [facility] #: [phone] Route: Oral  
  
We Performed the Following SCHEDULE NUCLEAR STUDY [CNX6938 Custom] Comments:  
 exercise Follow-up Instructions Return in about 2 weeks (around 1/9/2018), or if symptoms worsen or fail to improve, for same day post test.  
  
To-Do List   
 Around 12/29/2017 Cardiac Services:  2D ECHO COMPLETE ADULT (TTE) Around 01/02/2018 Lab:  METABOLIC PANEL, BASIC Patient Instructions Medications Discontinued During This Encounter Medication Reason  losartan (COZAAR) 50 mg tablet Reorder Orders Placed This Encounter  METABOLIC PANEL, BASIC Standing Status:   Future Standing Expiration Date:   3/28/2018  SCHEDULE NUCLEAR STUDY  
  exercise  2D ECHO COMPLETE ADULT (TTE) Standing Status:   Future Standing Expiration Date:   6/24/2018 Order Specific Question:   Reason for Exam: Answer:   CHF  furosemide (LASIX) 40 mg tablet Sig: Take 1 Tab by mouth daily as needed. Dispense:  30 Tab Refill:  1  
 losartan (COZAAR) 50 mg tablet Sig: Take 0.5 Tabs by mouth daily. Indications: Chronic Heart Failure Dispense:  30 Tab Refill:  3 Avoiding Triggers With Heart Failure: Care Instructions Your Care Instructions Triggers are anything that make your heart failure flare up. A flare-up is also called \"sudden heart failure\" or \"acute heart failure. \" When you have a flare-up, fluid builds up in your lungs, and you have problems breathing. You might need to go to the hospital. By watching for changes in your condition and avoiding triggers, you can prevent heart failure flare-ups. Follow-up care is a key part of your treatment and safety. Be sure to make and go to all appointments, and call your doctor if you are having problems. It's also a good idea to know your test results and keep a list of the medicines you take. How can you care for yourself at home? Watch for changes in your weight and condition · Weigh yourself without clothing at the same time each day. Record your weight. Call your doctor if you have sudden weight gain, such as more than 2 to 3 pounds in a day or 5 pounds in a week. (Your doctor may suggest a different range of weight gain.) A sudden weight gain may mean that your heart failure is getting worse. · Keep a daily record of your symptoms. Write down any changes in how you feel, such as new shortness of breath, cough, or problems eating. Also record if your ankles are more swollen than usual and if you feel more tired than usual. Note anything that you ate or did that could have triggered these changes. Limit sodium Sodium causes your body to hold on to extra water. This may cause your heart failure symptoms to get worse. People get most of their sodium from processed foods. Fast food and restaurant meals also tend to be very high in sodium. · Your doctor may suggest that you limit sodium to 2,000 milligrams (mg) a day or less. That is less than 1 teaspoon of salt a day, including all the salt you eat in cooking or in packaged foods. · Read food labels on cans and food packages. They tell you how much sodium you get in one serving. Check the serving size. If you eat more than one serving, you are getting more sodium.  
· Be aware that sodium can come in forms other than salt, including monosodium glutamate (MSG), sodium citrate, and sodium bicarbonate (baking soda). MSG is often added to Asian food. You can sometimes ask for food without MSG or salt. · Slowly reducing salt will help you adjust to the taste. Take the salt shaker off the table. · Flavor your food with garlic, lemon juice, onion, vinegar, herbs, and spices instead of salt. Do not use soy sauce, steak sauce, onion salt, garlic salt, mustard, or ketchup on your food, unless it is labeled \"low-sodium\" or \"low-salt. \" 
· Make your own salad dressings, sauces, and ketchup without adding salt. · Use fresh or frozen ingredients, instead of canned ones, whenever you can. Choose low-sodium canned goods. · Eat less processed food and food from restaurants, including fast food. Exercise as directed Moderate, regular exercise is very good for your heart. It improves your blood flow and helps control your weight. But too much exercise can stress your heart and cause a heart failure flare-up. · Check with your doctor before you start an exercise program. 
· Walking is an easy way to get exercise. Start out slowly. Gradually increase the length and pace of your walk. Swimming, riding a bike, and using a treadmill are also good forms of exercise. · When you exercise, watch for signs that your heart is working too hard. You are pushing yourself too hard if you cannot talk while you are exercising. If you become short of breath or dizzy or have chest pain, stop, sit down, and rest. 
· Do not exercise when you do not feel well. Take medicines correctly · Take your medicines exactly as prescribed. Call your doctor if you think you are having a problem with your medicine. · Make a list of all the medicines you take. Include those prescribed to you by other doctors and any over-the-counter medicines, vitamins, or supplements you take. Take this list with you when you go to any doctor. · Take your medicines at the same time every day.  It may help you to post a list of all the medicines you take every day and what time of day you take them. · Make taking your medicine as simple as you can. Plan times to take your medicines when you are doing other things, such as eating a meal or getting ready for bed. This will make it easier to remember to take your medicines. · Get organized. Use helpful tools, such as daily or weekly pill containers. When should you call for help? Call 911 if you have symptoms of sudden heart failure such as: 
? · You have severe trouble breathing. ? · You cough up pink, foamy mucus. ? · You have a new irregular or rapid heartbeat. ?Call your doctor now or seek immediate medical care if: 
? · You have new or increased shortness of breath. ? · You are dizzy or lightheaded, or you feel like you may faint. ? · You have sudden weight gain, such as more than 2 to 3 pounds in a day or 5 pounds in a week. (Your doctor may suggest a different range of weight gain.) ? · You have increased swelling in your legs, ankles, or feet. ? · You are suddenly so tired or weak that you cannot do your usual activities. ? Watch closely for changes in your health, and be sure to contact your doctor if you develop new symptoms. Where can you learn more? Go to http://gordo-parish.info/. Enter H088 in the search box to learn more about \"Avoiding Triggers With Heart Failure: Care Instructions. \" Current as of: September 21, 2016 Content Version: 11.4 © 8392-5436 [employer]. Care instructions adapted under license by [employer] (which disclaims liability or warranty for this information). If you have questions about a medical condition or this instruction, always ask your healthcare professional. [address] any warranty or liability for your use of this information. Introducing [facility]!    
 Marilyn Cristina introduces [employer] patient portal. Now you can access parts of your medical record, email your doctor's office, and request medication refills online. 1. In your internet browser, go to [email or web address] 2. Click on the First Time User? Click Here link in the Sign In box. You will see the New Member Sign Up page. 3. Enter your Seam[employer] Receipts Access Code exactly as it appears below. You will not need to use this code after youve completed the sign-up process. If you do not sign up before the expiration date, you must request a new code. · Se[employer] Receipts Access Code: [email or web address] Expires: 3/14/2018 11:15 AM 
 
4. Enter the last four digits of your Social Security Number (xxxx) and Date of Birth (mm/dd/yyyy) as indicated and click Submit. You will be taken to the next sign-up page. 5. Create a Seamless Receipts ID. This will be your Seamless Receipts login ID and cannot be changed, so think of one that is secure and easy to remember. 6. Create a [employer]ceipts password. You can change your password at any time. 7. Enter your Password Reset Question and Answer. This can be used at a later time if you forget your password. 8. Enter your e-mail address. You will receive e-mail notification when new information is available in [address] 9. Click Sign Up. You can now view and download portions of your medical record. 10. Click the Download Summary menu link to download a portable copy of your medical information. If you have questions, please visit the Frequently Asked Questions section of the [employer]ceipt[employer] website. Remember, [employer]ceipts is NOT to be used for urgent needs. For medical emergencies, dial 911. Now available from your iPhone and Android! Please provide this summary of care documentation to your next provider. Your primary care clinician is listed as Vladislav Griffiths. If you have any questions after today's visit, please call [phone].

## 2018-01-08 ENCOUNTER — OFFICE VISIT (OUTPATIENT)
Dept: CARDIOLOGY CLINIC | Age: 78
End: 2018-01-08

## 2018-01-08 ENCOUNTER — CLINICAL SUPPORT (OUTPATIENT)
Dept: CARDIOLOGY CLINIC | Age: 78
End: 2018-01-08

## 2018-01-08 VITALS
HEART RATE: 86 BPM | WEIGHT: 194 LBS | SYSTOLIC BLOOD PRESSURE: 143 MMHG | BODY MASS INDEX: 26.28 KG/M2 | HEIGHT: 72 IN | DIASTOLIC BLOOD PRESSURE: 72 MMHG

## 2018-01-08 DIAGNOSIS — I10 ESSENTIAL HYPERTENSION: ICD-10-CM

## 2018-01-08 DIAGNOSIS — I25.10 CORONARY ARTERIOSCLEROSIS: ICD-10-CM

## 2018-01-08 DIAGNOSIS — I50.33 ACUTE ON CHRONIC DIASTOLIC CONGESTIVE HEART FAILURE (HCC): Primary | ICD-10-CM

## 2018-01-08 DIAGNOSIS — Z95.5 HISTORY OF CORONARY ARTERY STENT PLACEMENT: ICD-10-CM

## 2018-01-08 DIAGNOSIS — I50.9 ACUTE ON CHRONIC CONGESTIVE HEART FAILURE, UNSPECIFIED CONGESTIVE HEART FAILURE TYPE: ICD-10-CM

## 2018-01-08 DIAGNOSIS — I25.2 HISTORY OF MI (MYOCARDIAL INFARCTION): ICD-10-CM

## 2018-01-08 DIAGNOSIS — I51.9 HEART DISEASE: Primary | ICD-10-CM

## 2018-01-08 RX ORDER — FUROSEMIDE 40 MG/1
40 TABLET ORAL
Qty: 90 TAB | Refills: 1 | Status: SHIPPED | OUTPATIENT
Start: 2018-01-08

## 2018-01-08 RX ORDER — LOSARTAN POTASSIUM 50 MG/1
50 TABLET ORAL DAILY
Qty: 90 TAB | Refills: 1 | Status: SHIPPED | OUTPATIENT
Start: 2018-01-08 | End: 2018-08-17 | Stop reason: SDUPTHER

## 2018-01-08 NOTE — LETTER
Luke  
1940 1/8/2018 Dear Joe Farmer MD 
 
I had the pleasure of evaluating  Mr. Blanche Quiros in office today. Below are the relevant portions of my assessment and plan of care. ICD-10-CM ICD-9-CM 1. Acute on chronic diastolic congestive heart failure (HCC) I50.33 428.33   
  428.0   
 1/18 symptom better but wt same yet; incr losartan 2. Coronary arteriosclerosis I25.10 414.00 H/o PCI 2007 3. Essential hypertension I10 401.9 losartan (COZAAR) 50 mg tablet 1/18 incr losartan;  
get labs from PCP 4. Acute on chronic congestive heart failure, unspecified congestive heart failure type (HCC) I50.9 428.0 losartan (COZAAR) 50 mg tablet  
   furosemide (LASIX) 40 mg tablet 12/17 worsening edema; eval EF, ischemia 5. History of coronary artery stent placement Z95.5 V45.82   
6. History of MI (myocardial infarction) I25.2 412 Current Outpatient Prescriptions Medication Sig Dispense Refill  losartan (COZAAR) 50 mg tablet Take 1 Tab by mouth daily. Indications: Chronic Heart Failure 90 Tab 1  
 furosemide (LASIX) 40 mg tablet Take 1 Tab by mouth daily as needed. 90 Tab 1  Comp Stocking,Knee,Regular,Sml misc 2 Box by Does Not Apply route daily. Use while upright  Indications: edema 2 Box 0  
 levothyroxine (SYNTHROID) 50 mcg tablet Take 1 Tab by mouth.  omeprazole (PRILOSEC) 40 mg capsule TK ONE C PO  D  3  
 atorvastatin (LIPITOR) 40 mg tablet Take 40 mg by mouth.  aspirin delayed-release 81 mg tablet Take 81 mg by mouth.  docusate sodium (COLACE) 100 mg capsule Take 100 mg by mouth. Orders Placed This Encounter  losartan (COZAAR) 50 mg tablet Sig: Take 1 Tab by mouth daily. Indications: Chronic Heart Failure Dispense:  90 Tab Refill:  1  
 furosemide (LASIX) 40 mg tablet Sig: Take 1 Tab by mouth daily as needed. Dispense:  90 Tab Refill:  1 If you have questions, please do not hesitate to call me. I look forward to following Mr. Christine Larson along with you. Sincerely, Christiano Han MD

## 2018-01-08 NOTE — PROGRESS NOTES
1. Have you been to the ER, urgent care clinic since your last visit? Hospitalized since your last visit? No    2. Have you seen or consulted any other health care providers outside of the [address] [place] [address] since your last visit? Include any pap smears or colon screening. No     3. Since your last visit, have you had any of the following symptoms? shortness of breath. 4.  Have you had any blood work, X-rays or cardiac testing? Yes Where: PCP Reason for visit: Labs             5.  Where do you normally have your labs drawn? PCP    6. Do you need any refills today?    No

## 2018-01-08 NOTE — PROGRESS NOTES
HISTORY OF PRESENT ILLNESS  Saturnino Guevara is a 68 y.o. male. HPI Comments: Patient denies significant chest pain, palpitations, dizziness      Cholesterol Problem   The history is provided by the medical records and patient. Associated symptoms include shortness of breath. Pertinent negatives include no chest pain and no headaches. Shortness of Breath   The history is provided by the patient. This is a new problem. The problem occurs intermittently. The current episode started more than 1 week ago (11/17). The problem has not changed since onset. Associated symptoms include leg swelling. Pertinent negatives include no fever, no headaches, no cough, no wheezing, no PND, no orthopnea, no chest pain, no vomiting, no rash and no claudication. The problem's precipitants include exercise (1 flight). Hypertension   The history is provided by the medical records and patient. Associated symptoms include shortness of breath. Pertinent negatives include no chest pain and no headaches. Leg Swelling   The history is provided by the patient. This is a chronic problem. The current episode started more than 1 week ago. The problem occurs daily. The problem has been gradually improving. Associated symptoms include shortness of breath. Pertinent negatives include no chest pain and no headaches. The symptoms are aggravated by standing. The symptoms are relieved by sleep and medications. Review of Systems   Constitutional: Negative for chills, fever, malaise/fatigue and weight loss. HENT: Negative for nosebleeds. Eyes: Negative for discharge. Respiratory: Positive for shortness of breath. Negative for cough and wheezing. Cardiovascular: Positive for leg swelling. Negative for chest pain, palpitations, orthopnea, claudication and PND. Gastrointestinal: Negative for diarrhea, nausea and vomiting. Genitourinary: Negative for dysuria and hematuria. Musculoskeletal: Negative for joint pain.    Skin: Negative for rash. Neurological: Negative for dizziness, seizures, loss of consciousness and headaches. Endo/Heme/Allergies: Negative for polydipsia. Does not bruise/bleed easily. Psychiatric/Behavioral: Negative for depression and substance abuse. The patient does not have insomnia. No Known Allergies    Past Medical History:   Diagnosis Date    Cerebrovascular accident (CVA) ([facility] [address] late 80s    TIA    Coronary arteriosclerosis     Essential hypertension     Gastroesophageal reflux disease     Heart disease     Hyperlipidemia     Hypothyroid     Myocardial infarction     [facility]     1 core vahid 6- pt currently on AS    Renal disease     Renal stones        Family History   Problem Relation Age of Onset    Heart Attack Neg Hx     Stroke Neg Hx        Social History   Substance Use Topics    Smoking status: Former Smoker     Types: Cigarettes     Quit date: 9/7/2006    Smokeless tobacco: Never Used    Alcohol use No        Current Outpatient Prescriptions   Medication Sig    furosemide (LASIX) 40 mg tablet Take 1 Tab by mouth daily as needed.  losartan (COZAAR) 50 mg tablet Take 0.5 Tabs by mouth daily. Indications: Chronic Heart Failure    Comp Stocking,Knee,Regular,Sml misc 2 Box by Does Not Apply route daily. Use while upright  Indications: edema    levothyroxine (SYNTHROID) 50 mcg tablet Take 1 Tab by mouth.  omeprazole (PRILOSEC) 40 mg capsule TK ONE C PO  D    atorvastatin (LIPITOR) 40 mg tablet Take 40 mg by mouth.  aspirin delayed-release 81 mg tablet Take 81 mg by mouth.  docusate sodium (COLACE) 100 mg capsule Take 100 mg by mouth. No current facility-administered medications for this visit.          Past Surgical History:   Procedure Laterality Date    HX CORONARY STENT PLACEMENT  2007    HX HEART CATHETERIZATION      HX HERNIA REPAIR      HX OTHER SURGICAL  90s    b/l renal stents    HX UROLOGICAL      cystoscopy with bilateral stent placement       Visit Vitals    /72    Pulse 86    Ht 6' (1.829 m)    Wt 194 lb (88 kg)    BMI 26.31 kg/m2       Diagnostic Studies:  I have reviewed the relevant tests done on the patient and show as follows  EKG tracings reviewed by me today. CARDIOLOGY STUDIES 1/8/2018 11/21/2017   Exercise Nuclear Stress Test Result 65%EF, fixed inf defect -   Doppler US Vascular Result - DVT PVL negative   Some recent data might be hidden   1/18 ECHO  SUMMARY:  Left ventricle: Systolic function was at the lower limits of normal.  Ejection fraction was estimated in the range of 50 % to 55 %. No obvious  wall motion abnormalities identified in the views obtained. There was mild  concentric hypertrophy. Doppler parameters were consistent with abnormal  left ventricular relaxation (grade 1 diastolic dysfunction). Mitral valve: There was moderate annular calcification. There was mild  regurgitation. Aortic valve: There was mild regurgitation. Tricuspid valve: There was mild regurgitation. Pulmonic valve: There was mild regurgitation. Mr. Joe Flores has a reminder for a \"due or due soon\" health maintenance. I have asked that he contact his primary care provider for follow-up on this health maintenance. Physical Exam   Constitutional: He is oriented to person, place, and time. He appears well-developed and well-nourished. No distress. HENT:   Head: Normocephalic and atraumatic. Mouth/Throat: Normal dentition. Eyes: Right eye exhibits no discharge. Left eye exhibits no discharge. No scleral icterus. Neck: Neck supple. No JVD present. Carotid bruit is not present. No thyromegaly present. Cardiovascular: Normal rate, regular rhythm, S1 normal, S2 normal, normal heart sounds and intact distal pulses. Exam reveals decreased pulses. Exam reveals no gallop and no friction rub. No murmur heard. Pulmonary/Chest: Effort normal and breath sounds normal. He has no wheezes. He has no rales.    Abdominal: Soft. He exhibits no mass. There is no tenderness. Musculoskeletal: He exhibits edema (2+). Lymphadenopathy:        Right cervical: No superficial cervical adenopathy present. Left cervical: No superficial cervical adenopathy present. Neurological: He is alert and oriented to person, place, and time. Skin: Skin is warm and dry. No rash noted. Psychiatric: He has a normal mood and affect. His behavior is normal.       ASSESSMENT and PLAN          Diagnoses and all orders for this visit:    1. Acute on chronic diastolic congestive heart failure ([facility] [address]  Comments:  1/18 symptom better but wt same yet; incr losartan    2. Coronary arteriosclerosis  Comments:  H/o PCI 2007      3. Essential hypertension  Comments:  1/18 incr losartan;   get labs from PCP  Orders:  -     losartan (COZAAR) 50 mg tablet; Take 1 Tab by mouth daily. Indications: Chronic Heart Failure    4. Acute on chronic congestive heart failure, unspecified congestive heart failure type ([facility] [address]  Comments:  12/17 worsening edema; eval EF, ischemia  Orders:  -     losartan (COZAAR) 50 mg tablet; Take 1 Tab by mouth daily. Indications: Chronic Heart Failure  -     furosemide (LASIX) 40 mg tablet; Take 1 Tab by mouth daily as needed. 5. History of coronary artery stent placement    6. History of MI (myocardial infarction)        Pertinent laboratory and test data reviewed and discussed with patient. See patient instructions also for other medical advice given    Medications Discontinued During This Encounter   Medication Reason    losartan (COZAAR) 50 mg tablet Reorder    furosemide (LASIX) 40 mg tablet Reorder       Follow-up Disposition:  Return in about 6 months (around 7/8/2018), or if symptoms worsen or fail to improve.

## 2018-01-08 NOTE — PATIENT INSTRUCTIONS
Medications Discontinued During This Encounter   Medication Reason    losartan (COZAAR) 50 mg tablet Reorder    furosemide (LASIX) 40 mg tablet Reorder       Orders Placed This Encounter    losartan (COZAAR) 50 mg tablet     Sig: Take 1 Tab by mouth daily. Indications: Chronic Heart Failure     Dispense:  90 Tab     Refill:  1    furosemide (LASIX) 40 mg tablet     Sig: Take 1 Tab by mouth daily as needed. Dispense:  90 Tab     Refill:  1          Avoiding Triggers With Heart Failure: Care Instructions  Your Care Instructions    Triggers are anything that make your heart failure flare up. A flare-up is also called \"sudden heart failure\" or \"acute heart failure. \" When you have a flare-up, fluid builds up in your lungs, and you have problems breathing. You might need to go to the hospital. By watching for changes in your condition and avoiding triggers, you can prevent heart failure flare-ups. Follow-up care is a key part of your treatment and safety. Be sure to make and go to all appointments, and call your doctor if you are having problems. It's also a good idea to know your test results and keep a list of the medicines you take. How can you care for yourself at home? Watch for changes in your weight and condition  · Weigh yourself without clothing at the same time each day. Record your weight. Call your doctor if you have sudden weight gain, such as more than 2 to 3 pounds in a day or 5 pounds in a week. (Your doctor may suggest a different range of weight gain.) A sudden weight gain may mean that your heart failure is getting worse. · Keep a daily record of your symptoms. Write down any changes in how you feel, such as new shortness of breath, cough, or problems eating. Also record if your ankles are more swollen than usual and if you feel more tired than usual. Note anything that you ate or did that could have triggered these changes. Limit sodium  Sodium causes your body to hold on to extra water. This may cause your heart failure symptoms to get worse. People get most of their sodium from processed foods. Fast food and restaurant meals also tend to be very high in sodium. · Your doctor may suggest that you limit sodium to 2,000 milligrams (mg) a day or less. That is less than 1 teaspoon of salt a day, including all the salt you eat in cooking or in packaged foods. · Read food labels on cans and food packages. They tell you how much sodium you get in one serving. Check the serving size. If you eat more than one serving, you are getting more sodium. · Be aware that sodium can come in forms other than salt, including monosodium glutamate (MSG), sodium citrate, and sodium bicarbonate (baking soda). MSG is often added to Asian food. You can sometimes ask for food without MSG or salt. · Slowly reducing salt will help you adjust to the taste. Take the salt shaker off the table. · Flavor your food with garlic, lemon juice, onion, vinegar, herbs, and spices instead of salt. Do not use soy sauce, steak sauce, onion salt, garlic salt, mustard, or ketchup on your food, unless it is labeled \"low-sodium\" or \"low-salt. \"  · Make your own salad dressings, sauces, and ketchup without adding salt. · Use fresh or frozen ingredients, instead of canned ones, whenever you can. Choose low-sodium canned goods. · Eat less processed food and food from restaurants, including fast food. Exercise as directed  Moderate, regular exercise is very good for your heart. It improves your blood flow and helps control your weight. But too much exercise can stress your heart and cause a heart failure flare-up. · Check with your doctor before you start an exercise program.  · Walking is an easy way to get exercise. Start out slowly. Gradually increase the length and pace of your walk. Swimming, riding a bike, and using a treadmill are also good forms of exercise. · When you exercise, watch for signs that your heart is working too hard. You are pushing yourself too hard if you cannot talk while you are exercising. If you become short of breath or dizzy or have chest pain, stop, sit down, and rest.  · Do not exercise when you do not feel well. Take medicines correctly  · Take your medicines exactly as prescribed. Call your doctor if you think you are having a problem with your medicine. · Make a list of all the medicines you take. Include those prescribed to you by other doctors and any over-the-counter medicines, vitamins, or supplements you take. Take this list with you when you go to any doctor. · Take your medicines at the same time every day. It may help you to post a list of all the medicines you take every day and what time of day you take them. · Make taking your medicine as simple as you can. Plan times to take your medicines when you are doing other things, such as eating a meal or getting ready for bed. This will make it easier to remember to take your medicines. · Get organized. Use helpful tools, such as daily or weekly pill containers. When should you call for help? Call 911 if you have symptoms of sudden heart failure such as:  ? · You have severe trouble breathing. ? · You cough up pink, foamy mucus. ? · You have a new irregular or rapid heartbeat. ?Call your doctor now or seek immediate medical care if:  ? · You have new or increased shortness of breath. ? · You are dizzy or lightheaded, or you feel like you may faint. ? · You have sudden weight gain, such as more than 2 to 3 pounds in a day or 5 pounds in a week. (Your doctor may suggest a different range of weight gain.)   ? · You have increased swelling in your legs, ankles, or feet. ? · You are suddenly so tired or weak that you cannot do your usual activities. ? Watch closely for changes in your health, and be sure to contact your doctor if you develop new symptoms. Where can you learn more? Go to http://gordo-parish.info/.   Enter S352 in the search box to learn more about \"Avoiding Triggers With Heart Failure: Care Instructions. \"  Current as of: September 21, 2016  Content Version: 11.4  © 5917-1461 Healthwise, [employer]. Care instructions adapted under license by [employer] (which disclaims liability or warranty for this information). If you have questions about a medical condition or this instruction, always ask your healthcare professional. [place] [address] any warranty or liability for your use of this information. Requested labs from PCP.

## 2018-01-08 NOTE — PROGRESS NOTES
[facility]  [address], [address] [place] [address], [place], [address] [place] [address]  [phone] [place] [phone]      Name: Chely Irizarry         MRN#: [id number]      YOB: 1940     Gender: male Ht:6' \" Wt:194 lbs            Date of Rest/Stress Images: 1/8/2018   Referring Provider: Steven Rogers MD  Ordering Provider: Cj Andrews. Zeinab Grant MD, [facility]  Technologist: Swati Valerio. Dominik LEBLANC, C.N.M.T. Diagnosis:   1. Heart disease    2. Coronary arteriosclerosis    3. History of coronary artery stent placement          Rest/Stress Myoview SPECT Myocardial Perfusion Imaging with  Exercise Stress and Gated SPECT Imaging      PROCEDURE:      Myocardial perfusion imaging was performed at rest approximately 30 mins following the intravenous injection,(Right hand ) of 12.0 mCi of Tc99m Myoview for evaluation of myocardial function and perfusion at rest.     Baseline:   Heart rate 74. Blood pressure 164/80. EKG shows sinus rhythm, occasional PVCs, IVCD, no acute ST-T changes. Exercise data:  Patient exercised using standard Manjinder protocol. Patient exercised for a total of 5 minutes and 27 seconds achieving 7.1 METS. Exercise was stopped due to fatigue at patient's request.  Max heart rate is 130 which is 90 % of predicted maximal.  Max BP is 190/100 EKG has no ST-T changes by standard criteria. Conclusions :  1. No ischemic ST-T changes up to 90 % of predicted maximum heart rate. 2.  Normal functional capacity. 3.  No symptoms or arrhythmias with exercise. 4.  Appropriate heart rate and blood pressure response. 5.  Perfusion images report to follow    Exercise: At peak exercise, the patient was injected intravenously with 36.1 mCi of Tc99m Myoview and exercise was continued for 15 to 45 seconds. The stress images were obtained approximately 30 minutes post tracer injection.  The stress SPECT study was gated to evaluate regional wall motion and calculate the left ventricular ejection fraction. The data was reconstructed in the short, horizontal long and vertical long axis views and tomographic slices were generated. NUCLEAR IMAGING:   Findings:  1. Stress images show mild to moderately reduced Myoview uptake in the entire inferior wall and adjoining inferoseptal area seen in short axis, horizontal and vertical long axis views. 2. Resting images have no significant redistribution. 3. Gated images reveal normal wall motion and ejection fraction is calculated at 65%. Diagnosis:   1. Abnormal perfusion scan. 2. Evidence of a moderate size fixed defect involving inferior wall and inferoseptal area indicating possible previous myocardial infarction or even diaphragmatic attenuation. 3. Normal wall motion and preserved ejection fraction at 65%. 4. This is an intermittent risk scan. 5. Clinical correlation is suggested. Thank you for the referral.    E-signed and Interpreting Physician:    Fany Olivas.  Shruthi Sterling MD, [facility]     Date of interpretation: 1/8/2018  Date of final report: 1/8/2018

## 2018-01-08 NOTE — MR AVS SNAPSHOT
Visit Information Date & Time Provider Department Dept. Phone Encounter #  
 1/8/2018 11:15 AM Alexsandra Curtis MD [facility] [place] [phone] Follow-up Instructions Return in about 6 months (around 7/8/2018), or if symptoms worsen or fail to improve. Your Appointments 4/16/2018  9:30 AM  
Nurse Visit with [facility] [address] ([address]) Appt Note: PSA  
 [address] [facility] [address] [id number]  
  
    
 4/30/2018  9:45 AM  
ESTABLISHED PATIENT with Jerson Wagoner MD  
[facility] [address]) Appt Note: 6 MTHS FLOW PVR UA PSA PRIOR  
 [address] [facility] [address] Upcoming Health Maintenance Date Due DTaP/Tdap/Td series (1 - Tdap) 6/6/1961 ZOSTER VACCINE AGE 60> 4/6/2000 GLAUCOMA SCREENING Q2Y 6/6/2005 MEDICARE YEARLY EXAM 6/6/2005 Influenza Age 5 to Adult 8/1/2017 Pneumococcal 65+ High/Highest Risk (2 of 2 - PPSV23) 11/2/2017 Allergies as of 1/8/2018  Review Complete On: 1/8/2018 By: Alexsandra Curtis MD  
 No Known Allergies Current Immunizations  Never Reviewed No immunizations on file. Not reviewed this visit You Were Diagnosed With   
  
 Codes Comments Acute on chronic diastolic congestive heart failure ([facility]    -  Primary ICD-10-CM: I50.33 ICD-9-CM: 428.33, 428.0 1/18 symptom better but wt same yet; incr losartan Coronary arteriosclerosis     ICD-10-CM: I25.10 ICD-9-CM: 414.00 H/o PCI 2007 Essential hypertension     ICD-10-CM: I10 
ICD-9-CM: 401.9 1/18 incr losartan;  
get labs from PCP Acute on chronic congestive heart failure, unspecified congestive heart failure type ([facility]     ICD-10-CM: I50.9 ICD-9-CM: 428.0 12/17 worsening edema; eval EF, ischemia History of coronary artery stent placement     ICD-10-CM: Z95.5 ICD-9-CM: V45.82 History of MI (myocardial infarction)     ICD-10-CM: I25.2 ICD-9-CM: 763 Vitals BP Pulse Height(growth percentile) Weight(growth percentile) BMI Smoking Status 143/72 86 6' (1.829 m) 194 lb (88 kg) 26.31 kg/m2 Former Smoker Vitals History BMI and BSA Data Body Mass Index Body Surface Area  
 26.31 kg/m 2 2.11 m 2 Preferred Pharmacy Pharmacy Name Phone [facility] [address] [place], [place] [id number] [facility] [address] [phone] Your Updated Medication List  
  
   
This list is accurate as of: 1/8/18 11:55 AM.  Always use your most recent med list.  
  
  
  
  
 aspirin delayed-release 81 mg tablet Take 81 mg by mouth. atorvastatin 40 mg tablet Commonly known as:  LIPITOR Take 40 mg by mouth. Comp Stocking,Knee,Regular,Sml Misc 2 Box by Does Not Apply route daily. Use while upright  Indications: edema  
  
 docusate sodium 100 mg capsule Commonly known as:  Mary Sabot Take 100 mg by mouth. furosemide 40 mg tablet Commonly known as:  LASIX Take 1 Tab by mouth daily as needed. levothyroxine 50 mcg tablet Commonly known as:  SYNTHROID Take 1 Tab by mouth.  
  
 losartan 50 mg tablet Commonly known as:  COZAAR Take 1 Tab by mouth daily. Indications: Chronic Heart Failure  
  
 omeprazole 40 mg capsule Commonly known as:  PRILOSEC TK ONE C PO  D Prescriptions Sent to Pharmacy Refills  
 losartan (COZAAR) 50 mg tablet 1 Sig: Take 1 Tab by mouth daily. Indications: Chronic Heart Failure Class: Normal  
 Pharmacy: [address] [place] [address], [address] [place] [address] Ph #: [phone] Route: Oral  
 furosemide (LASIX) 40 mg tablet 1 Sig: Take 1 Tab by mouth daily as needed. Class: Normal  
 Pharmacy: [address] [place] [address], [address] [place] [address] Ph #: [phone] Route: Oral  
  
Follow-up Instructions Return in about 6 months (around 7/8/2018), or if symptoms worsen or fail to improve. Patient Instructions Medications Discontinued During This Encounter Medication Reason  losartan (COZAAR) 50 mg tablet Reorder  furosemide (LASIX) 40 mg tablet Reorder Orders Placed This Encounter  losartan (COZAAR) 50 mg tablet Sig: Take 1 Tab by mouth daily. Indications: Chronic Heart Failure Dispense:  90 Tab Refill:  1  
 furosemide (LASIX) 40 mg tablet Sig: Take 1 Tab by mouth daily as needed. Dispense:  90 Tab Refill:  1 Avoiding Triggers With Heart Failure: Care Instructions Your Care Instructions Triggers are anything that make your heart failure flare up. A flare-up is also called \"sudden heart failure\" or \"acute heart failure. \" When you have a flare-up, fluid builds up in your lungs, and you have problems breathing. You might need to go to the hospital. By watching for changes in your condition and avoiding triggers, you can prevent heart failure flare-ups. Follow-up care is a key part of your treatment and safety. Be sure to make and go to all appointments, and call your doctor if you are having problems. It's also a good idea to know your test results and keep a list of the medicines you take. How can you care for yourself at home? Watch for changes in your weight and condition · Weigh yourself without clothing at the same time each day. Record your weight. Call your doctor if you have sudden weight gain, such as more than 2 to 3 pounds in a day or 5 pounds in a week. (Your doctor may suggest a different range of weight gain.) A sudden weight gain may mean that your heart failure is getting worse. · Keep a daily record of your symptoms. Write down any changes in how you feel, such as new shortness of breath, cough, or problems eating.  Also record if your ankles are more swollen than usual and if you feel more tired than usual. Note anything that you ate or did that could have triggered these changes. Limit sodium Sodium causes your body to hold on to extra water. This may cause your heart failure symptoms to get worse. People get most of their sodium from processed foods. Fast food and restaurant meals also tend to be very high in sodium. · Your doctor may suggest that you limit sodium to 2,000 milligrams (mg) a day or less. That is less than 1 teaspoon of salt a day, including all the salt you eat in cooking or in packaged foods. · Read food labels on cans and food packages. They tell you how much sodium you get in one serving. Check the serving size. If you eat more than one serving, you are getting more sodium. · Be aware that sodium can come in forms other than salt, including monosodium glutamate (MSG), sodium citrate, and sodium bicarbonate (baking soda). MSG is often added to Asian food. You can sometimes ask for food without MSG or salt. · Slowly reducing salt will help you adjust to the taste. Take the salt shaker off the table. · Flavor your food with garlic, lemon juice, onion, vinegar, herbs, and spices instead of salt. Do not use soy sauce, steak sauce, onion salt, garlic salt, mustard, or ketchup on your food, unless it is labeled \"low-sodium\" or \"low-salt. \" 
· Make your own salad dressings, sauces, and ketchup without adding salt. · Use fresh or frozen ingredients, instead of canned ones, whenever you can. Choose low-sodium canned goods. · Eat less processed food and food from restaurants, including fast food. Exercise as directed Moderate, regular exercise is very good for your heart. It improves your blood flow and helps control your weight. But too much exercise can stress your heart and cause a heart failure flare-up.  
· Check with your doctor before you start an exercise program. 
 · Walking is an easy way to get exercise. Start out slowly. Gradually increase the length and pace of your walk. Swimming, riding a bike, and using a treadmill are also good forms of exercise. · When you exercise, watch for signs that your heart is working too hard. You are pushing yourself too hard if you cannot talk while you are exercising. If you become short of breath or dizzy or have chest pain, stop, sit down, and rest. 
· Do not exercise when you do not feel well. Take medicines correctly · Take your medicines exactly as prescribed. Call your doctor if you think you are having a problem with your medicine. · Make a list of all the medicines you take. Include those prescribed to you by other doctors and any over-the-counter medicines, vitamins, or supplements you take. Take this list with you when you go to any doctor. · Take your medicines at the same time every day. It may help you to post a list of all the medicines you take every day and what time of day you take them. · Make taking your medicine as simple as you can. Plan times to take your medicines when you are doing other things, such as eating a meal or getting ready for bed. This will make it easier to remember to take your medicines. · Get organized. Use helpful tools, such as daily or weekly pill containers. When should you call for help? Call 911 if you have symptoms of sudden heart failure such as: 
? · You have severe trouble breathing. ? · You cough up pink, foamy mucus. ? · You have a new irregular or rapid heartbeat. ?Call your doctor now or seek immediate medical care if: 
? · You have new or increased shortness of breath. ? · You are dizzy or lightheaded, or you feel like you may faint. ? · You have sudden weight gain, such as more than 2 to 3 pounds in a day or 5 pounds in a week. (Your doctor may suggest a different range of weight gain.) ? · You have increased swelling in your legs, ankles, or feet. ? · You are suddenly so tired or weak that you cannot do your usual activities. ? Watch closely for changes in your health, and be sure to contact your doctor if you develop new symptoms. Where can you learn more? Go to http://gordo-parish.info/. Enter K632 in the search box to learn more about \"Avoiding Triggers With Heart Failure: Care Instructions. \" Current as of: September 21, 2016 Content Version: 11.4 © 4832-1010 Healthwise, [employer]. Care instructions adapted under license by [employer] (which disclaims liability or warranty for this information). If you have questions about a medical condition or this instruction, always ask your healthcare professional. [address] any warranty or liability for your use of this information. Introducing [facility]! Jose Armando Meeks introduces Boostable patient portal. Now you can access parts of your medical record, email your doctor's office, and request medication refills online. 1. In your internet browser, go to [email or web address] 2. Click on the First Time User? Click Here link in the Sign In box. You will see the New Member Sign Up page. 3. Enter your Boostable Access Code exactly as it appears below. You will not need to use this code after youve completed the sign-up process. If you do not sign up before the expiration date, you must request a new code. · Boostable Access Code: [email or web address] Expires: 3/14/2018 11:15 AM 
 
4. Enter the last four digits of your Social Security Number (xxxx) and Date of Birth (mm/dd/yyyy) as indicated and click Submit. You will be taken to the next sign-up page. 5. Create a Boostable ID. This will be your Boostable login ID and cannot be changed, so think of one that is secure and easy to remember. 6. Create a [employer] password. You can change your password at any time. 7. Enter your Password Reset Question and Answer. This can be used at a later time if you forget your password. 8. Enter your e-mail address. You will receive e-mail notification when new information is available in [address] 9. Click Sign Up. You can now view and download portions of your medical record. 10. Click the Download Summary menu link to download a portable copy of your medical information. If you have questions, please visit the Frequently Asked Questions section of the [employer] website. Remember, [employer] is NOT to be used for urgent needs. For medical emergencies, dial 911. Now available from your iPhone and Android! Please provide this summary of care documentation to your next provider. Your primary care clinician is listed as Vladislav Griffiths. If you have any questions after today's visit, please call [phone].

## 2018-01-09 DIAGNOSIS — I50.9 ACUTE ON CHRONIC CONGESTIVE HEART FAILURE, UNSPECIFIED CONGESTIVE HEART FAILURE TYPE: ICD-10-CM

## 2018-01-18 ENCOUNTER — TELEPHONE (OUTPATIENT)
Dept: CARDIOLOGY CLINIC | Age: 78
End: 2018-01-18

## 2018-01-18 DIAGNOSIS — Z95.5 HX OF RIGHT CORONARY ARTERY STENT PLACEMENT: Primary | ICD-10-CM

## 2018-01-18 RX ORDER — CLOPIDOGREL BISULFATE 75 MG/1
75 TABLET ORAL DAILY
Qty: 90 TAB | Refills: 1 | Status: SHIPPED | OUTPATIENT
Start: 2018-01-18 | End: 2022-10-04 | Stop reason: SDUPTHER

## 2018-01-18 NOTE — TELEPHONE ENCOUNTER
Patient's girlfriend called to discuss restarting of Plavix. He voices understanding and acceptance of this advice and will call back if any further questions or concerns.

## 2018-01-18 NOTE — TELEPHONE ENCOUNTER
Patient's girlfriend called to discuss various symptoms since Plavix was discontinued by Dr. Jason Álvarez on last visit. He is having swelling, fatigue,shortness of breath, leg pain and sleeping longer. She states that he would like to be put back on plavix, since he took it for 10 years. Please advise.

## 2018-07-05 ENCOUNTER — HOSPITAL ENCOUNTER (OUTPATIENT)
Dept: WOUND CARE | Facility: HOSPITAL | Age: 78
Discharge: HOME | End: 2018-07-05
Attending: INTERNAL MEDICINE | Admitting: INTERNAL MEDICINE

## 2018-07-13 ENCOUNTER — HOSPITAL ENCOUNTER (OUTPATIENT)
Dept: PAIN MEDICINE | Facility: HOSPITAL | Age: 78
Discharge: HOME | End: 2018-07-13
Attending: NURSE PRACTITIONER | Admitting: NURSE PRACTITIONER

## 2018-07-13 LAB
AMPICILLIN SUSC ISLT: ABNORMAL
AZTREONAM SUSC ISLT: ABNORMAL
BACTERIA ISLT: ABNORMAL
CEFAZOLIN SUSC ISLT: ABNORMAL
CEFEPIME SUSC ISLT: ABNORMAL
CEFTRIAXONE SUSC ISLT: ABNORMAL
ERTAPENEM SUSC ISLT: ABNORMAL
GRAM STN SPEC: ABNORMAL
LEVOFLOXACIN SUSC ISLT: ABNORMAL
Lab: ABNORMAL
MEROPENEM SUSC ISLT: ABNORMAL
MEROPENEM SUSC ISLT: ABNORMAL
MICRO REPORT STATUS: ABNORMAL
PIP+TAZO SUSC ISLT: ABNORMAL
SPECIMEN SOURCE: ABNORMAL
SUSC METH SPEC: ABNORMAL
SUSC METH SPEC: ABNORMAL
TIGECYCLINE ISLT MIC: ABNORMAL
TOBRAMYCIN SUSC ISLT: ABNORMAL
TRIMETHOPRIM/SULFA: ABNORMAL

## 2020-09-15 RX ORDER — LEVOTHYROXINE SODIUM 50 UG/1
TABLET ORAL
Qty: 90 TAB | Refills: 2 | Status: SHIPPED | OUTPATIENT
Start: 2020-09-15 | End: 2021-04-30

## 2021-04-30 RX ORDER — LEVOTHYROXINE SODIUM 50 UG/1
TABLET ORAL
Qty: 90 TAB | Refills: 2 | Status: SHIPPED | OUTPATIENT
Start: 2021-04-30

## 2021-11-20 ENCOUNTER — HOSPITAL ENCOUNTER (EMERGENCY)
Age: 81
Discharge: HOME | End: 2021-11-20
Attending: EMERGENCY MEDICINE
Payer: MEDICARE

## 2021-11-20 VITALS
TEMPERATURE: 97.7 F | DIASTOLIC BLOOD PRESSURE: 64 MMHG | HEIGHT: 72 IN | SYSTOLIC BLOOD PRESSURE: 131 MMHG | HEART RATE: 75 BPM | WEIGHT: 150 LBS | RESPIRATION RATE: 18 BRPM | BODY MASS INDEX: 20.32 KG/M2 | OXYGEN SATURATION: 100 %

## 2021-11-20 DIAGNOSIS — I10 ESSENTIAL HYPERTENSION: Primary | ICD-10-CM

## 2021-11-20 PROCEDURE — 99284 EMERGENCY DEPT VISIT MOD MDM: CPT

## 2021-11-20 NOTE — ED PROVIDER NOTES
EMERGENCY DEPARTMENT HISTORY AND PHYSICAL EXAM      Date: 11/20/2021  Patient Name: Zabrina Zaman    History of Presenting Illness     Chief Complaint   Patient presents with    Hypertension       History Provided By: Patient    HPI: Zabrina Zaman, 80 y.o. male with a past medical history significant stroke and TIA presents to the ED with cc of elevated blood pressure intermittently today. First reading was 174/94 then it was 125/60. He denies CP, or SOB. Earlier this morning patient had a headache and but none now. He report that he is not walking straight since this morning. There are no other complaints, changes, or physical findings at this time. PCP: Yogi Grant MD    No current facility-administered medications on file prior to encounter. Current Outpatient Medications on File Prior to Encounter   Medication Sig Dispense Refill    levothyroxine (SYNTHROID) 50 mcg tablet TAKE 1 TABLET EVERY DAY 90 Tab 2    losartan (COZAAR) 50 mg tablet TAKE 1 TABLET BY MOUTH DAILY FOR CHRONIC HEART FAILURE 90 Tab 1    clopidogrel (PLAVIX) 75 mg tab Take 1 Tab by mouth daily. 90 Tab 1    furosemide (LASIX) 40 mg tablet Take 1 Tab by mouth daily as needed. 90 Tab 1    Comp Stocking,Knee,Regular,Sml misc 2 Box by Does Not Apply route daily. Use while upright  Indications: edema 2 Box 0    omeprazole (PRILOSEC) 40 mg capsule TK ONE C PO  D  3    aspirin delayed-release 81 mg tablet Take 81 mg by mouth.  docusate sodium (COLACE) 100 mg capsule Take 100 mg by mouth.          Past History     Past Medical History:  Past Medical History:   Diagnosis Date    Cerebrovascular accident (CVA) ([facility] [address] late 80s    TIA    Coronary arteriosclerosis     Essential hypertension     Gastroesophageal reflux disease     Heart disease     Hyperlipidemia     Hypothyroid     Myocardial infarction ([facility] [address]     Prostate cancer ([facility] [address]     1 core vahid 6- pt currently on AS    Renal disease     Renal stones        Past Surgical History:  Past Surgical History:   Procedure Laterality Date    HX CORONARY STENT PLACEMENT  2007    HX HEART CATHETERIZATION      HX HERNIA REPAIR      HX OTHER SURGICAL  90s    b/l renal stents    HX UROLOGICAL      cystoscopy with bilateral stent placement       Family History:  Family History   Problem Relation Age of Onset    Heart Attack Neg Hx     Stroke Neg Hx        Social History:  Social History     Tobacco Use    Smoking status: Former Smoker     Types: Cigarettes     Quit date: 9/7/2006     Years since quitting: 15.2    Smokeless tobacco: Never Used   Substance Use Topics    Alcohol use: No    Drug use: Not on file       Allergies:  No Known Allergies      Review of Systems     Review of Systems   Constitutional: Negative. HENT: Negative. Eyes: Negative. Respiratory: Negative. Cardiovascular: Negative. Gastrointestinal: Negative. Endocrine: Negative. Genitourinary: Negative. Musculoskeletal: Negative. Skin: Negative. Allergic/Immunologic: Negative. Neurological: Negative. Negative for headaches. Hematological: Negative. Psychiatric/Behavioral: Negative. All other systems reviewed and are negative. Physical Exam     Physical Exam  Vitals and nursing note reviewed. Constitutional:       Appearance: Normal appearance. HENT:      Head: Normocephalic. Nose: Nose normal.      Mouth/Throat:      Mouth: Mucous membranes are moist.   Eyes:      Pupils: Pupils are equal, round, and reactive to light. Cardiovascular:      Rate and Rhythm: Normal rate. Pulmonary:      Effort: Pulmonary effort is normal.   Abdominal:      General: Abdomen is flat. Musculoskeletal:         General: Tenderness present. Normal range of motion. Cervical back: Normal range of motion. Comments: Tenderness on the right side   Skin:     General: Skin is warm. Capillary Refill: Capillary refill takes less than 2 seconds.    Neurological: General: No focal deficit present. Mental Status: He is alert and oriented to person, place, and time. Psychiatric:         Mood and Affect: Mood normal.         Lab and Diagnostic Study Results     Labs -   No results found for this or any previous visit (from the past 12 hour(s)). Radiologic Studies -   @lastxrresult@  CT Results  (Last 48 hours)    None        CXR Results  (Last 48 hours)    None            Medical Decision Making   - I am the first provider for this patient. - I reviewed the vital signs, available nursing notes, past medical history, past surgical history, family history and social history. - Initial assessment performed. The patients presenting problems have been discussed, and they are in agreement with the care plan formulated and outlined with them. I have encouraged them to ask questions as they arise throughout their visit. Vital Signs-Reviewed the patient's vital signs. Patient Vitals for the past 12 hrs:   Temp Pulse Resp BP SpO2   11/20/21 1604 -- 75 18 131/64 100 %   11/20/21 1548 97.7 °F (36.5 °C) 80 18 (!) 176/77 100 %       Records Reviewed: Nursing Notes    The patient presents with headache with a differential diagnosis of  cerebral hemorrhage, cluster headache, CVA, ICH, meningitis/encephalitis, migraine, sinusitis, tension headache, toxic and vascular headache      ED Course: This is an 80-year-old gentleman that presents to the ED for  blood pressure check   He stated that this morning his blood pressure was as high as 174/94 and then he rechecked it about an hour later and it was down to 124/74. Patient denies any shortness of breath or chest pain however patient states that he noticed that he was not walking straight since this morning patient was offered a CT scan of the brain however  he refused stating he wanted to go home with his dog.   The pro's and con's discussed with patient that he could be having a stroke but it was to no avail, he refused and left.       Provider Notes (Medical Decision Making): MDM       Procedures   Medical Decision Makingedical Decision Making  Performed by: Hailey Lilly MD  PROCEDURES:Procedures       Disposition   Disposition: Condition improved  DC- Adult Discharges: All of the diagnostic tests were reviewed and questions answered. Diagnosis, care plan and treatment options were discussed. The patient understands the instructions and will follow up as directed. The patients results have been reviewed with them. They have been counseled regarding their diagnosis. The patient verbally convey understanding and agreement of the signs, symptoms, diagnosis, treatment and prognosis and additionally agrees to follow up as recommended with their PCP in 24 - 48 hours. They also agree with the care-plan and convey that all of their questions have been answered. I have also put together some discharge instructions for them that include: 1) educational information regarding their diagnosis, 2) how to care for their diagnosis at home, as well a 3) list of reasons why they would want to return to the ED prior to their follow-up appointment, should their condition change. DISCHARGE PLAN:  1. Current Discharge Medication List      CONTINUE these medications which have NOT CHANGED    Details   levothyroxine (SYNTHROID) 50 mcg tablet TAKE 1 TABLET EVERY DAY  Qty: 90 Tab, Refills: 2      losartan (COZAAR) 50 mg tablet TAKE 1 TABLET BY MOUTH DAILY FOR CHRONIC HEART FAILURE  Qty: 90 Tab, Refills: 1    Associated Diagnoses: Essential hypertension      clopidogrel (PLAVIX) 75 mg tab Take 1 Tab by mouth daily. Qty: 90 Tab, Refills: 1    Associated Diagnoses: Hx of right coronary artery stent placement      furosemide (LASIX) 40 mg tablet Take 1 Tab by mouth daily as needed.   Qty: 90 Tab, Refills: 1    Associated Diagnoses: Acute on chronic congestive heart failure, unspecified congestive heart failure type      Comp Stocking,Knee,Regular,Sml misc 2 Box by Does Not Apply route daily. Use while upright  Indications: edema  Qty: 2 Box, Refills: 0    Associated Diagnoses: Edema, unspecified type      omeprazole (PRILOSEC) 40 mg capsule TK ONE C PO  D  Refills: 3      aspirin delayed-release 81 mg tablet Take 81 mg by mouth. docusate sodium (COLACE) 100 mg capsule Take 100 mg by mouth. 2.   Follow-up Information    None       3. Return to ED if worse   4. Current Discharge Medication List            Diagnosis     Clinical Impression:     ICD-10-CM ICD-9-CM    1. Essential hypertension  I10 401.9          Jeremias Lundberg MD    Please note that this dictation was completed with [employer], the computer voice recognition software. Quite often unanticipated grammatical, syntax, homophones, and other interpretive errors are inadvertently transcribed by the computer software. Please disregard these errors. Please excuse any errors that have escaped final proofreading. Thank you.

## 2021-11-20 NOTE — ED TRIAGE NOTES
Patient reports that when he woke up this morning at 0430 he was \"feeling bad\", so he took his blood pressure & it was high. States that he rechecked it at 0730 & it was was 125/60s. States that he went on outside & when he got back in & it was 150s. Denies chest pain/SOB. Reports that he doesn't feel like he is walking straight since this morning.

## 2022-03-19 PROBLEM — I50.33 ACUTE ON CHRONIC DIASTOLIC CONGESTIVE HEART FAILURE (HCC): Status: ACTIVE | Noted: 2018-01-08

## 2022-03-19 PROBLEM — I25.2 HISTORY OF MI (MYOCARDIAL INFARCTION): Status: ACTIVE | Noted: 2017-11-03

## 2022-03-19 PROBLEM — N41.1 PROSTATITIS, CHRONIC: Status: ACTIVE | Noted: 2017-09-07

## 2022-03-19 PROBLEM — Z95.5 HISTORY OF CORONARY ARTERY STENT PLACEMENT: Status: ACTIVE | Noted: 2017-11-03

## 2022-03-20 PROBLEM — R60.9 EDEMA: Status: ACTIVE | Noted: 2017-11-03

## 2022-03-29 ENCOUNTER — TELEPHONE (OUTPATIENT)
Dept: PRIMARY CARE CLINIC | Age: 82
End: 2022-03-29

## 2022-03-29 NOTE — TELEPHONE ENCOUNTER
Pt called and stated he was bitten by a tick a week ago in the armpit and he has a red line running from the bite to the elbow and he thought nothing of it but he has started to feel really bad and having body aches and wanted to know if we had any available openings today and I advised pt that we did not and encouraged him to go to ED or urgent care for treatment.  Pt understood

## 2022-04-04 ENCOUNTER — OFFICE VISIT (OUTPATIENT)
Dept: PRIMARY CARE CLINIC | Age: 82
End: 2022-04-04
Payer: MEDICARE

## 2022-04-04 VITALS
DIASTOLIC BLOOD PRESSURE: 82 MMHG | HEIGHT: 72 IN | BODY MASS INDEX: 21.37 KG/M2 | SYSTOLIC BLOOD PRESSURE: 148 MMHG | WEIGHT: 157.8 LBS | OXYGEN SATURATION: 98 % | HEART RATE: 78 BPM | TEMPERATURE: 97.7 F | RESPIRATION RATE: 18 BRPM

## 2022-04-04 DIAGNOSIS — R68.89 ABNORMAL ANKLE BRACHIAL INDEX (ABI): ICD-10-CM

## 2022-04-04 DIAGNOSIS — I50.9 CHRONIC CONGESTIVE HEART FAILURE, UNSPECIFIED HEART FAILURE TYPE (HCC): ICD-10-CM
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
## 2022-04-04 DIAGNOSIS — K21.9 GASTROESOPHAGEAL REFLUX DISEASE WITHOUT ESOPHAGITIS: ICD-10-CM

## 2022-04-04 DIAGNOSIS — I25.810 CORONARY ARTERY DISEASE INVOLVING CORONARY BYPASS GRAFT OF NATIVE HEART WITHOUT ANGINA PECTORIS: Primary | ICD-10-CM

## 2022-04-04 DIAGNOSIS — E03.9 ACQUIRED HYPOTHYROIDISM: ICD-10-CM

## 2022-04-04 DIAGNOSIS — E78.2 MIXED HYPERLIPIDEMIA: ICD-10-CM

## 2022-04-04 DIAGNOSIS — R97.20 ELEVATED PSA: ICD-10-CM

## 2022-04-04 DIAGNOSIS — R41.3 MEMORY CHANGES: ICD-10-CM

## 2022-04-04 DIAGNOSIS — I71.40 ABDOMINAL AORTIC ANEURYSM (AAA) WITHOUT RUPTURE: ICD-10-CM

## 2022-04-04 DIAGNOSIS — I10 PRIMARY HYPERTENSION: ICD-10-CM

## 2022-04-04 DIAGNOSIS — Z86.73 HISTORY OF TIA (TRANSIENT ISCHEMIC ATTACK): ICD-10-CM

## 2022-04-04 DIAGNOSIS — N40.0 BENIGN PROSTATIC HYPERPLASIA, UNSPECIFIED WHETHER LOWER URINARY TRACT SYMPTOMS PRESENT: ICD-10-CM

## 2022-04-04 PROBLEM — E78.5 DYSLIPIDEMIA: Status: ACTIVE | Noted: 2022-03-29

## 2022-04-04 PROCEDURE — G8427 DOCREV CUR MEDS BY ELIG CLIN: HCPCS | Performed by: NURSE PRACTITIONER

## 2022-04-04 PROCEDURE — 1101F PT FALLS ASSESS-DOCD LE1/YR: CPT | Performed by: NURSE PRACTITIONER

## 2022-04-04 PROCEDURE — G8754 DIAS BP LESS 90: HCPCS | Performed by: NURSE PRACTITIONER

## 2022-04-04 PROCEDURE — G8536 NO DOC ELDER MAL SCRN: HCPCS | Performed by: NURSE PRACTITIONER

## 2022-04-04 PROCEDURE — 99204 OFFICE O/P NEW MOD 45 MIN: CPT | Performed by: NURSE PRACTITIONER

## 2022-04-04 PROCEDURE — G8432 DEP SCR NOT DOC, RNG: HCPCS | Performed by: NURSE PRACTITIONER

## 2022-04-04 PROCEDURE — G8420 CALC BMI NORM PARAMETERS: HCPCS | Performed by: NURSE PRACTITIONER

## 2022-04-04 PROCEDURE — G8753 SYS BP > OR = 140: HCPCS | Performed by: NURSE PRACTITIONER

## 2022-04-04 RX ORDER — MEMANTINE HYDROCHLORIDE 10 MG/1
TABLET ORAL 2 TIMES DAILY
COMMUNITY
End: 2022-04-12 | Stop reason: SDUPTHER

## 2022-04-04 RX ORDER — DOXYCYCLINE 100 MG/1
CAPSULE ORAL
COMMUNITY
Start: 2022-03-29 | End: 2022-04-04

## 2022-04-04 RX ORDER — TAMSULOSIN HYDROCHLORIDE 0.4 MG/1
0.4 CAPSULE ORAL DAILY
COMMUNITY

## 2022-04-04 RX ORDER — AMLODIPINE BESYLATE 5 MG/1
TABLET ORAL
COMMUNITY
Start: 2022-03-25 | End: 2022-10-04 | Stop reason: SDUPTHER

## 2022-04-04 RX ORDER — ATORVASTATIN CALCIUM 40 MG/1
TABLET, FILM COATED ORAL
COMMUNITY
Start: 2022-03-16 | End: 2022-04-26 | Stop reason: ALTCHOICE

## 2022-04-04 NOTE — PROGRESS NOTES
Chief Complaint   Patient presents with    New Patient    Hypertension    Thyroid Problem     Pt did not bring his meds or a list and he stated he really george not know the names of his meds. 1. \"Have you been to the ER, urgent care clinic since your last visit? Hospitalized since your last visit? \" No    2. \"Have you seen or consulted any other health care providers outside of the [address] [place] [address] since your last visit? \" [facility]      3. For patients aged 39-70: Has the patient had a colonoscopy / FIT/ Cologuard? NA - based on age      If the patient is female:    4. For patients aged 41-77: Has the patient had a mammogram within the past 2 years? NA - based on age or sex      11. For patients aged 21-65: Has the patient had a pap smear?  NA - based on age or sex

## 2022-04-04 NOTE — PROGRESS NOTES
Zeyad Jerry is a 80 y.o. male who presents to the office today for the following:    Chief Complaint   Patient presents with    New Patient    Hypertension    Thyroid Problem       Past Medical History:   Diagnosis Date    Cerebrovascular accident (CVA) ([facility] [address] late 80s    TIA    Coronary arteriosclerosis     Essential hypertension     Gastroesophageal reflux disease     Heart disease     Hyperlipidemia     Hypothyroid     Myocardial infarction ([facility] [address]     Renal disease     Renal stones        Past Surgical History:   Procedure Laterality Date    HX CORONARY STENT PLACEMENT  2007    HX HEART CATHETERIZATION      HX HERNIA REPAIR      HX OTHER SURGICAL  90s    b/l renal stents    HX UROLOGICAL      cystoscopy with bilateral stent placement        Family History   Problem Relation Age of Onset    Heart Attack Neg Hx     Stroke Neg Hx         Social History     Tobacco Use    Smoking status: Former Smoker     Types: Cigarettes     Quit date: 9/7/2006     Years since quitting: 15.6    Smokeless tobacco: Never Used   Substance Use Topics    Alcohol use: No    Drug use: Not on file        HPI  Patient here today as new patient to provider with reported PMH of CAD, hypertension, hyperlipidemia, hypothyroidism, GERD, CHF, TIA, BPH , memory changes and Abdominal aneurysm. Reports taking medications as noted in chart. Has recently been seeing [facility] but states he is unsure if he will go back. Reports he was not happy with staff at office and feels he was charged too much for test. Did see urgent care about 1 week ago due to tick bite in right axilla. States this is improved now and is still finishing antibiotics. Current Outpatient Medications on File Prior to Visit   Medication Sig    atorvastatin (LIPITOR) 40 mg tablet     amLODIPine (NORVASC) 5 mg tablet     tamsulosin (FLOMAX) 0.4 mg capsule Take 0.4 mg by mouth daily.     memantine (NAMENDA) 10 mg tablet Take  by mouth two (2) times a day.  levothyroxine (SYNTHROID) 50 mcg tablet TAKE 1 TABLET EVERY DAY    losartan (COZAAR) 50 mg tablet TAKE 1 TABLET BY MOUTH DAILY FOR CHRONIC HEART FAILURE    clopidogrel (PLAVIX) 75 mg tab Take 1 Tab by mouth daily.  furosemide (LASIX) 40 mg tablet Take 1 Tab by mouth daily as needed.  omeprazole (PRILOSEC) 40 mg capsule TK ONE C PO  D    aspirin delayed-release 81 mg tablet Take 81 mg by mouth. No current facility-administered medications on file prior to visit. No orders of the defined types were placed in this encounter. Review of Systems   Constitutional: Negative. HENT: Negative for congestion, ear pain, nosebleeds, sinus pain and sore throat. Eyes: Negative for pain and redness. Respiratory: Negative. Negative for stridor. Cardiovascular: Negative. Gastrointestinal: Negative. Genitourinary: Negative. Musculoskeletal: Positive for myalgias. Skin:        Tick bite right axillary   Neurological: Negative. Psychiatric/Behavioral: Negative. Visit Vitals  BP (!) 148/82 (BP 1 Location: Left upper arm, BP Patient Position: Sitting, BP Cuff Size: Adult)   Pulse 78   Temp 97.7 °F (36.5 °C) (Temporal)   Resp 18   Ht 6' (1.829 m)   Wt 157 lb 12.8 oz (71.6 kg)   SpO2 98%   BMI 21.40 kg/m²       Physical Exam  Vitals and nursing note reviewed. Constitutional:       Appearance: Normal appearance. HENT:      Right Ear: Tympanic membrane normal.      Left Ear: Tympanic membrane normal.   Eyes:      Pupils: Pupils are equal, round, and reactive to light. Neck:      Vascular: No carotid bruit. Cardiovascular:      Rate and Rhythm: Normal rate and regular rhythm. Pulses: Normal pulses. Heart sounds: Murmur heard. Pulmonary:      Effort: Pulmonary effort is normal.      Breath sounds: Normal breath sounds. Abdominal:      General: Bowel sounds are normal.      Palpations: Abdomen is soft. Tenderness:  There is no abdominal tenderness. There is no guarding. Musculoskeletal:         General: Normal range of motion. Right lower leg: No edema. Left lower leg: No edema. Lymphadenopathy:      Cervical: No cervical adenopathy. Skin:     General: Skin is warm and dry. Comments: Small papule noted to right axillary with no surrounding erythema or drainage noted. Neurological:      Mental Status: He is alert and oriented to person, place, and time. Mental status is at baseline. Psychiatric:         Mood and Affect: Mood normal.         Behavior: Behavior normal.            1. Coronary artery disease involving coronary bypass graft of native heart without angina pectoris  No new ischemia per cardiology on recent stress test 1/22  Continue statin, plavix, ARB  Does not know if he is going to have return to [facility] but will let us know if he needs new referral  - CBC WITH AUTOMATED DIFF  - METABOLIC PANEL, COMPREHENSIVE  - URINALYSIS W/ RFLX MICROSCOPIC    2. Primary hypertension  Blood pressure is not controlled and continue medications as directed  Repeat blood pressure at lab visit on 4/12/22  Encourage to monitor at home and bring readings to appointment     3. Mixed hyperlipidemia  On atorvastatin as directed  - LIPID PANEL    4. Acquired hypothyroidism  ON levothyroxine 50mcq daily and will check TSH with labs    5. Gastroesophageal reflux disease without esophagitis  Controlled    6. Chronic congestive heart failure, unspecified heart failure type ([address]  EF 50% on 1/22  On lasix 40mg daily and essentially euvolemic today  Does not know if he is going to have return to [facility] but will let us know if he needs new referral    7. History of TIA (transient ischemic attack)  On Plavix    8.  Benign prostatic hyperplasia, unspecified whether lower urinary tract symptoms present  Lab Results   Component Value Date/Time    Prostate Specific Ag 5.2 (H) 09/07/2017 10:57 AM Prostate Specific Ag 3.92 12/10/2015 12:00 AM    Prostate Specific Ag 3.89 09/03/2015 12:00 AM    Prostate Specific Ag 3.66 06/01/2015 12:00 AM    Prostate Specific Ag 3.68 06/01/2015 12:00 AM   Check PSA today but denies any symptoms  - PSA W/ REFLX FREE PSA    9. Tick bite, unspecified site, initial encounter  Healing and was given doxycycline at urgent care     10. Abdominal aortic aneurysm (AAA) without rupture ([address]  CT abdomen/xmnoay60/ 2017:  Minimal bilobed fusiform aneurysmal dilation of the infrarenal aorta measuring up to 3.2 cm in size. There are atherosclerotic calcifications involving the ostia of the renal arteries and SMA with bilateral renal artery ostial stents. He declines any re-evaluation of this and verbalizes understanding of risks which can be life threatening if not monitored and condition worsens. Will update cardiology on this. 11. Memory changes  On namenda as directed and reports no worsening memory changes    12. Abnormal ankle brachial index (SARAHI)  Noted in notes from cardiology   He declines any re-evaluation of this and verbalizes understanding of risks which can be life or limb threatening if not monitored and condition worsens. Patient verbalizes understanding of plan of care as discussed above    Follow-up and Dispositions    · Return in about 3 months (around 7/4/2022) for or sooner for worsening symptoms.

## 2022-04-05 ENCOUNTER — TELEPHONE (OUTPATIENT)
Dept: PRIMARY CARE CLINIC | Age: 82
End: 2022-04-05

## 2022-04-08 ENCOUNTER — TELEPHONE (OUTPATIENT)
Dept: PRIMARY CARE CLINIC | Age: 82
End: 2022-04-08

## 2022-04-08 NOTE — TELEPHONE ENCOUNTER
Spoke with Ke Prince at [facility]. She stated she was out last week and did not get my message, but, patient was scheduled for more studies and labs and he came by office and told her he was not going to have any more tests or labs done. She stated she tried to convince him to come back to talk with the provider about this and he told her no, he was not coming back. The results in the chart are the only ones they have.

## 2022-04-08 NOTE — TELEPHONE ENCOUNTER
----- Message from Elver Arriaga NP sent at 4/4/2022 11:02 AM EDT -----  Please get updated test results and notes from [facility]- I see some but see If newer as looks like they had additional tests.

## 2022-04-12 PROBLEM — N41.1 PROSTATITIS, CHRONIC: Status: RESOLVED | Noted: 2017-09-07 | Resolved: 2022-04-12

## 2022-04-12 RX ORDER — MEMANTINE HYDROCHLORIDE 10 MG/1
10 TABLET ORAL 2 TIMES DAILY
Qty: 90 TABLET | Refills: 1 | Status: SHIPPED | OUTPATIENT
Start: 2022-04-12 | End: 2022-06-10 | Stop reason: SDUPTHER

## 2022-04-12 NOTE — TELEPHONE ENCOUNTER
Requested Prescriptions     Pending Prescriptions Disp Refills    memantine (NAMENDA) 10 mg tablet       Sig: Take  by mouth two (2) times a day.

## 2022-04-13 LAB
ALBUMIN SERPL-MCNC: 4.4 G/DL (ref 3.6–4.6)
ALBUMIN/GLOB SERPL: 1.8 {RATIO} (ref 1.2–2.2)
ALP SERPL-CCNC: 117 IU/L (ref 44–121)
ALT SERPL-CCNC: 18 IU/L (ref 0–44)
APPEARANCE UR: CLEAR
AST SERPL-CCNC: 27 IU/L (ref 0–40)
BASOPHILS # BLD AUTO: 0 X10E3/UL (ref 0–0.2)
BASOPHILS NFR BLD AUTO: 1 %
BILIRUB SERPL-MCNC: 0.6 MG/DL (ref 0–1.2)
BILIRUB UR QL STRIP: NEGATIVE
BUN SERPL-MCNC: 37 MG/DL (ref 8–27)
BUN/CREAT SERPL: 25 (ref 10–24)
CALCIUM SERPL-MCNC: 9.4 MG/DL (ref 8.6–10.2)
CHLORIDE SERPL-SCNC: 104 MMOL/L (ref 96–106)
CHOLEST SERPL-MCNC: 74 MG/DL (ref 100–199)
CO2 SERPL-SCNC: 22 MMOL/L (ref 20–29)
COLOR UR: YELLOW
CREAT SERPL-MCNC: 1.48 MG/DL (ref 0.76–1.27)
EGFR: 47 ML/MIN/1.73
EOSINOPHIL # BLD AUTO: 0.1 X10E3/UL (ref 0–0.4)
EOSINOPHIL NFR BLD AUTO: 3 %
ERYTHROCYTE [DISTWIDTH] IN BLOOD BY AUTOMATED COUNT: 13.5 % (ref 11.6–15.4)
GLOBULIN SER CALC-MCNC: 2.5 G/DL (ref 1.5–4.5)
GLUCOSE SERPL-MCNC: 108 MG/DL (ref 65–99)
GLUCOSE UR QL STRIP: NEGATIVE
HCT VFR BLD AUTO: 37.5 % (ref 37.5–51)
HDLC SERPL-MCNC: 37 MG/DL
HGB BLD-MCNC: 12.2 G/DL (ref 13–17.7)
HGB UR QL STRIP: NEGATIVE
IMM GRANULOCYTES # BLD AUTO: 0.1 X10E3/UL (ref 0–0.1)
IMM GRANULOCYTES NFR BLD AUTO: 1 %
KETONES UR QL STRIP: NEGATIVE
LDLC SERPL CALC-MCNC: 25 MG/DL (ref 0–99)
LEUKOCYTE ESTERASE UR QL STRIP: NEGATIVE
LYMPHOCYTES # BLD AUTO: 1 X10E3/UL (ref 0.7–3.1)
LYMPHOCYTES NFR BLD AUTO: 19 %
MCH RBC QN AUTO: 29.9 PG (ref 26.6–33)
MCHC RBC AUTO-ENTMCNC: 32.5 G/DL (ref 31.5–35.7)
MCV RBC AUTO: 92 FL (ref 79–97)
MICRO URNS: NORMAL
MONOCYTES # BLD AUTO: 1.3 X10E3/UL (ref 0.1–0.9)
MONOCYTES NFR BLD AUTO: 25 %
MORPHOLOGY BLD-IMP: ABNORMAL
NEUTROPHILS # BLD AUTO: 2.7 X10E3/UL (ref 1.4–7)
NEUTROPHILS NFR BLD AUTO: 51 %
NITRITE UR QL STRIP: NEGATIVE
PH UR STRIP: 5.5 [PH] (ref 5–7.5)
PLATELET # BLD AUTO: 153 X10E3/UL (ref 150–450)
POTASSIUM SERPL-SCNC: 4.9 MMOL/L (ref 3.5–5.2)
PROT SERPL-MCNC: 6.9 G/DL (ref 6–8.5)
PROT UR QL STRIP: NORMAL
PSA FREE MFR SERPL: 18.5 %
PSA FREE SERPL-MCNC: 1.65 NG/ML
PSA SERPL-MCNC: 8.9 NG/ML (ref 0–4)
RBC # BLD AUTO: 4.08 X10E6/UL (ref 4.14–5.8)
REFLEX CRITERIA: ABNORMAL
SODIUM SERPL-SCNC: 143 MMOL/L (ref 134–144)
SP GR UR STRIP: 1.02 (ref 1–1.03)
TRIGL SERPL-MCNC: 41 MG/DL (ref 0–149)
UROBILINOGEN UR STRIP-MCNC: 0.2 MG/DL (ref 0.2–1)
VLDLC SERPL CALC-MCNC: 12 MG/DL (ref 5–40)
WBC # BLD AUTO: 5.1 X10E3/UL (ref 3.4–10.8)

## 2022-04-26 RX ORDER — ATORVASTATIN CALCIUM 20 MG/1
1 TABLET, FILM COATED ORAL DAILY
COMMUNITY

## 2022-04-27 NOTE — PROGRESS NOTES
Please let patient know that PSA has gone up to 8.9 which is a significant increase. He denied any symptoms but will need him to see urology to evaluate this further. Can be elevated in both enlarged prostate and prostate cancer so needs further eval of this to clarify. His cholesterol is actually low with total 74 and bad cholesterol at 25. We need to reduce his atorvastatin to 20mg daily from 40mg (cut the current dose in 1/2)and repeat lipid panel in 3 mo. Kidney functions have worsened some since prior done in 10/2021 and will repeat again at 3mo also. If he has any questions, let me know. Would like to see if he has any home blood pressure readings as this was elevated at visit.

## 2022-04-27 NOTE — PROGRESS NOTES
Informed patient of lab results and recommendations per DIANE Davalos NP. Patient stated understanding and had no questions at this time. He agreed to the referral to see Dr. Katelyn Dalton.

## 2022-06-10 RX ORDER — MEMANTINE HYDROCHLORIDE 10 MG/1
10 TABLET ORAL 2 TIMES DAILY
Qty: 90 TABLET | Refills: 1 | Status: SHIPPED | OUTPATIENT
Start: 2022-06-10 | End: 2022-10-04 | Stop reason: SDUPTHER

## 2022-06-10 NOTE — TELEPHONE ENCOUNTER
Requested Prescriptions     Pending Prescriptions Disp Refills    memantine (NAMENDA) 10 mg tablet 90 Tablet 1     Sig: Take 1 Tablet by mouth two (2) times a day.

## 2022-07-07 ENCOUNTER — OFFICE VISIT (OUTPATIENT)
Dept: PRIMARY CARE CLINIC | Age: 82
End: 2022-07-07
Payer: MEDICARE

## 2022-07-07 VITALS
BODY MASS INDEX: 20.59 KG/M2 | TEMPERATURE: 97.8 F | HEIGHT: 72 IN | DIASTOLIC BLOOD PRESSURE: 64 MMHG | OXYGEN SATURATION: 98 % | RESPIRATION RATE: 18 BRPM | SYSTOLIC BLOOD PRESSURE: 139 MMHG | WEIGHT: 152 LBS | HEART RATE: 79 BPM

## 2022-07-07 DIAGNOSIS — I50.9 CHRONIC CONGESTIVE HEART FAILURE, UNSPECIFIED HEART FAILURE TYPE (HCC): ICD-10-CM

## 2022-07-07 DIAGNOSIS — Z00.00 MEDICARE ANNUAL WELLNESS VISIT, SUBSEQUENT: ICD-10-CM

## 2022-07-07 DIAGNOSIS — E78.2 MIXED HYPERLIPIDEMIA: ICD-10-CM

## 2022-07-07 DIAGNOSIS — K21.9 GASTROESOPHAGEAL REFLUX DISEASE WITHOUT ESOPHAGITIS: ICD-10-CM

## 2022-07-07 DIAGNOSIS — R68.89 ABNORMAL ANKLE BRACHIAL INDEX (ABI): ICD-10-CM

## 2022-07-07 DIAGNOSIS — N40.0 BENIGN PROSTATIC HYPERPLASIA, UNSPECIFIED WHETHER LOWER URINARY TRACT SYMPTOMS PRESENT: ICD-10-CM

## 2022-07-07 DIAGNOSIS — Z71.89 ACP (ADVANCE CARE PLANNING): ICD-10-CM

## 2022-07-07 DIAGNOSIS — I71.40 ABDOMINAL AORTIC ANEURYSM (AAA) WITHOUT RUPTURE: ICD-10-CM

## 2022-07-07 DIAGNOSIS — I25.810 CORONARY ARTERY DISEASE INVOLVING CORONARY BYPASS GRAFT OF NATIVE HEART WITHOUT ANGINA PECTORIS: Primary | ICD-10-CM

## 2022-07-07 DIAGNOSIS — K12.1 STOMATITIS: ICD-10-CM

## 2022-07-07 DIAGNOSIS — D69.6 THROMBOCYTOPENIA (HCC): ICD-10-CM

## 2022-07-07 DIAGNOSIS — R41.3 MEMORY CHANGES: ICD-10-CM

## 2022-07-07 DIAGNOSIS — Z86.73 HISTORY OF TIA (TRANSIENT ISCHEMIC ATTACK): ICD-10-CM

## 2022-07-07 DIAGNOSIS — I10 PRIMARY HYPERTENSION: ICD-10-CM

## 2022-07-07 DIAGNOSIS — T14.8XXA PUNCTURE WOUND: ICD-10-CM

## 2022-07-07 DIAGNOSIS — E03.9 ACQUIRED HYPOTHYROIDISM: ICD-10-CM

## 2022-07-07 PROCEDURE — G8752 SYS BP LESS 140: HCPCS | Performed by: NURSE PRACTITIONER

## 2022-07-07 PROCEDURE — G8432 DEP SCR NOT DOC, RNG: HCPCS | Performed by: NURSE PRACTITIONER

## 2022-07-07 PROCEDURE — G8427 DOCREV CUR MEDS BY ELIG CLIN: HCPCS | Performed by: NURSE PRACTITIONER

## 2022-07-07 PROCEDURE — 1101F PT FALLS ASSESS-DOCD LE1/YR: CPT | Performed by: NURSE PRACTITIONER

## 2022-07-07 PROCEDURE — G8754 DIAS BP LESS 90: HCPCS | Performed by: NURSE PRACTITIONER

## 2022-07-07 PROCEDURE — 99214 OFFICE O/P EST MOD 30 MIN: CPT | Performed by: NURSE PRACTITIONER

## 2022-07-07 PROCEDURE — G8536 NO DOC ELDER MAL SCRN: HCPCS | Performed by: NURSE PRACTITIONER

## 2022-07-07 PROCEDURE — 1123F ACP DISCUSS/DSCN MKR DOCD: CPT | Performed by: NURSE PRACTITIONER

## 2022-07-07 PROCEDURE — 90715 TDAP VACCINE 7 YRS/> IM: CPT | Performed by: NURSE PRACTITIONER

## 2022-07-07 PROCEDURE — G0439 PPPS, SUBSEQ VISIT: HCPCS | Performed by: NURSE PRACTITIONER

## 2022-07-07 PROCEDURE — G8420 CALC BMI NORM PARAMETERS: HCPCS | Performed by: NURSE PRACTITIONER

## 2022-07-07 RX ORDER — CHLORHEXIDINE GLUCONATE 1.2 MG/ML
15 RINSE ORAL EVERY 12 HOURS
Qty: 420 ML | Refills: 0 | Status: SHIPPED | OUTPATIENT
Start: 2022-07-07 | End: 2022-07-11 | Stop reason: SDUPTHER

## 2022-07-07 NOTE — PROGRESS NOTES
Chief Complaint   Patient presents with    Annual Wellness Visit    Hypertension     Medicare wellness     1. \"Have you been to the ER, urgent care clinic since your last visit? Hospitalized since your last visit? \" No    2. \"Have you seen or consulted any other health care providers outside of the [address] [place] [address] since your last visit? \" No     3. For patients aged 39-70: Has the patient had a colonoscopy / FIT/ Cologuard? NA - based on age      If the patient is female:    4. For patients aged 41-77: Has the patient had a mammogram within the past 2 years? NA - based on age or sex      11. For patients aged 21-65: Has the patient had a pap smear?  NA - based on age or sex

## 2022-07-07 NOTE — PROGRESS NOTES
Christine Nieto is a 80 y.o. male who presents to the office today for the following:    Chief Complaint   Patient presents with    Annual Wellness Visit    Hypertension       Past Medical History:   Diagnosis Date    Cerebrovascular accident (CVA) ([facility] [address] late 80s    TIA    Coronary arteriosclerosis     Essential hypertension     Gastroesophageal reflux disease     Heart disease     Hyperlipidemia     Hypothyroid     Myocardial infarction ([facility] [address]     Renal disease     Renal stones        Past Surgical History:   Procedure Laterality Date    HX CORONARY STENT PLACEMENT  2007    HX HEART CATHETERIZATION      HX HERNIA REPAIR      HX OTHER SURGICAL  90s    b/l renal stents    HX UROLOGICAL      cystoscopy with bilateral stent placement        Family History   Problem Relation Age of Onset    Heart Attack Neg Hx     Stroke Neg Hx         Social History     Tobacco Use    Smoking status: Former Smoker     Types: Cigarettes     Quit date: 9/7/2006     Years since quitting: 15.8    Smokeless tobacco: Never Used   Substance Use Topics    Alcohol use: No    Drug use: Not on file        HPI  Patient here today for follow up with PMH of CAD, hypertension, hyperlipidemia, hypothyroidism, GERD, CHF, TIA, BPH , memory changes and Abdominal aneurysm. Reports taking medications as noted in chart. Has seen [facility] and also recently seeing Dr. Maribell Judge due to elevated PSA. States that he was out in yard few days ago and stepped on something. Feels this is healing however. Unsure last Tetanus but thinks greater than 5 years. Requesting a refill on some mouth rinse. States that he has times where certain foods cause pain in his mouth. Started around the time he began using dentures but also experiences a lot of dry mouth which he attributes to medicine.     Current Outpatient Medications on File Prior to Visit   Medication Sig    memantine (NAMENDA) 10 mg tablet Take 1 Tablet by mouth two (2) times a day.    atorvastatin (LIPITOR) 20 mg tablet Take 1 Tablet by mouth daily.  amLODIPine (NORVASC) 5 mg tablet     tamsulosin (FLOMAX) 0.4 mg capsule Take 0.4 mg by mouth daily.  levothyroxine (SYNTHROID) 50 mcg tablet TAKE 1 TABLET EVERY DAY    losartan (COZAAR) 50 mg tablet TAKE 1 TABLET BY MOUTH DAILY FOR CHRONIC HEART FAILURE    clopidogrel (PLAVIX) 75 mg tab Take 1 Tab by mouth daily.  furosemide (LASIX) 40 mg tablet Take 1 Tab by mouth daily as needed.  omeprazole (PRILOSEC) 40 mg capsule TK ONE C PO  D    aspirin delayed-release 81 mg tablet Take 81 mg by mouth. No current facility-administered medications on file prior to visit. Medications Ordered Today   Medications    chlorhexidine (PERIDEX) 0.12 % solution     Sig: 15 mL by Swish and Spit route every twelve (12) hours for 14 days. Dispense:  420 mL     Refill:  0        Review of Systems   Constitutional: Negative. HENT: Negative for congestion, ear pain, nosebleeds, sinus pain and sore throat. Eyes: Negative for pain and redness. Respiratory: Negative. Negative for stridor. Cardiovascular: Negative. Gastrointestinal: Negative. Genitourinary: Negative. Musculoskeletal: Positive for myalgias. Neurological: Negative. Psychiatric/Behavioral: Negative. Visit Vitals  /64 (BP 1 Location: Left upper arm, BP Patient Position: Sitting, BP Cuff Size: Adult)   Pulse 79   Temp 97.8 °F (36.6 °C) (Temporal)   Resp 18   Ht 6' (1.829 m)   Wt 152 lb (68.9 kg)   SpO2 98%   BMI 20.61 kg/m²       Physical Exam  Vitals and nursing note reviewed. Constitutional:       Appearance: Normal appearance. HENT:      Right Ear: Tympanic membrane normal.      Left Ear: Tympanic membrane normal.   Eyes:      Pupils: Pupils are equal, round, and reactive to light. Neck:      Vascular: No carotid bruit. Cardiovascular:      Rate and Rhythm: Normal rate and regular rhythm. Pulses: Normal pulses. Heart sounds: Murmur heard. Pulmonary:      Effort: Pulmonary effort is normal.      Breath sounds: Normal breath sounds. Abdominal:      General: Bowel sounds are normal.      Palpations: Abdomen is soft. Tenderness: There is no abdominal tenderness. There is no guarding. Musculoskeletal:         General: Normal range of motion. Right lower leg: No edema. Left lower leg: No edema. Feet:    Feet:      Comments: Small puncture wound right foot with no surrounding erythema, warmth or drainage  Lymphadenopathy:      Cervical: No cervical adenopathy. Skin:     General: Skin is warm and dry. Neurological:      Mental Status: He is alert and oriented to person, place, and time. Mental status is at baseline. Psychiatric:         Mood and Affect: Mood normal.         Behavior: Behavior normal.            1. Coronary artery disease involving coronary bypass graft of native heart without angina pectoris  No new ischemia per cardiology on recent stress test 1/22  Continue statin, plavix, ARB  Was encouraged to continue care with cardiology  - CBC WITH AUTOMATED DIFF  - METABOLIC PANEL, COMPREHENSIVE  - URINALYSIS W/ RFLX MICROSCOPIC  - LIPID PANEL  - TSH RFX ON ABNORMAL TO FREE T4    2. Primary hypertension  Blood pressure is controlled and continue medications as directed  Encourage to monitor at home and bring readings to appointment     3. Mixed hyperlipidemia  Lab Results   Component Value Date/Time    LDL, calculated 25 04/12/2022 08:24 AM     On atorvastatin as directed  - LIPID PANEL    4. Acquired hypothyroidism  On levothyroxine 50mcq daily    5. Gastroesophageal reflux disease without esophagitis  Controlled    6. Chronic congestive heart failure, unspecified heart failure type ([facility] [address]  EF 50% on 1/22  On lasix 40mg daily and essentially euvolemic today  Was encouraged to continue care with cardiology    7. History of TIA (transient ischemic attack)  On Plavix    8.  Benign prostatic hyperplasia, unspecified whether lower urinary tract symptoms present  Lab Results   Component Value Date/Time    Prostate Specific Ag 8.9 (H) 04/12/2022 08:24 AM    Prostate Specific Ag 5.2 (H) 09/07/2017 10:57 AM    Prostate Specific Ag 3.92 12/10/2015 12:00 AM    Prostate Specific Ag 3.89 09/03/2015 12:00 AM    Prostate Specific Ag 3.66 06/01/2015 12:00 AM    Prostate Specific Ag 3.68 06/01/2015 12:00 AM    % Free PSA 18.5 04/12/2022 08:24 AM   Was referred to Dr. Abram Moreira and has follow up scheduled    9. Abdominal aortic aneurysm (AAA) without rupture ([address]  CT abdomen/gkqvbl21/ 2017:  Minimal bilobed fusiform aneurysmal dilation of the infrarenal aorta measuring up to 3.2 cm in size. There are atherosclerotic calcifications involving the ostia of the renal arteries and SMA with bilateral renal artery ostial stents. He declines any re-evaluation of this and verbalizes understanding of risks which can be life threatening if not monitored and condition worsens. Will update cardiology on this. 10. Memory changes  On namenda as directed and reports no worsening memory changes    11. Abnormal ankle brachial index (SARAHI)  Noted in notes from cardiology   He declines any re-evaluation of this and verbalizes understanding of risks which can be life or limb threatening if not monitored and condition worsens. 12. Puncture wound  No signs of infection today  Advised to wash with soap and water. Keep covered with bandaid until healed. Update TDAP as reports none in last 5 years  Notify provider immediately  if increasing redness, warmth or pain to area  - TDAP, BOOSTRIX, (AGE 10 YRS+), IM    13. Stomatitis  Uses prn for acute flare ups  - chlorhexidine (PERIDEX) 0.12 % solution; 15 mL by Swish and Spit route every twelve (12) hours for 14 days.   Dispense: 420 mL; Refill: 0        Patient verbalizes understanding of plan of care as discussed above    Follow-up and Dispositions    · Return in about 6 months (around 1/7/2023) for or sooner for worsening symptoms. This is the Subsequent Medicare Annual Wellness Exam, performed 12 months or more after the Initial AWV or the last Subsequent AWV    I have reviewed the patient's medical history in detail and updated the computerized patient record. Assessment/Plan   Education and counseling provided:  Are appropriate based on today's review and evaluation  End-of-Life planning (with patient's consent)  Pneumococcal Vaccine  Influenza Vaccine  Hepatitis B Vaccine  Prostate cancer screening tests (PSA, covered annually)  Colorectal cancer screening tests  Cardiovascular screening blood test  Screening for glaucoma  Diabetes screening test    1. Coronary artery disease involving coronary bypass graft of native heart without angina pectoris  -     CBC WITH AUTOMATED DIFF  -     METABOLIC PANEL, COMPREHENSIVE  -     URINALYSIS W/ RFLX MICROSCOPIC  -     LIPID PANEL  -     TSH RFX ON ABNORMAL TO FREE T4  2. Primary hypertension  3. Mixed hyperlipidemia  4. Acquired hypothyroidism  5. Gastroesophageal reflux disease without esophagitis  6. Chronic congestive heart failure, unspecified heart failure type ([address]  7. History of TIA (transient ischemic attack)  8. Benign prostatic hyperplasia, unspecified whether lower urinary tract symptoms present  9. Abdominal aortic aneurysm (AAA) without rupture ([address]  10. Memory changes  11. Abnormal ankle brachial index (SARAHI)  12. Puncture wound  -     TDAP, BOOSTRIX, (AGE 10 YRS+), IM  13. Stomatitis  -     chlorhexidine (PERIDEX) 0.12 % solution; 15 mL by Swish and Spit route every twelve (12) hours for 14 days. , Normal, Disp-420 mL, R-0  14. Medicare annual wellness visit, subsequent  15.  ACP (advance care planning)       Depression Risk Factor Screening     3 most recent PHQ Screens 7/7/2022   Little interest or pleasure in doing things Not at all   Feeling down, depressed, irritable, or hopeless Not at all   Total Score PHQ 2 0       Alcohol & Drug Abuse Risk Screen    Do you average more than 1 drink per night or more than 7 drinks a week: No    In the past three months have you have had more than 4 drinks containing alcohol on one occasion: No          Functional Ability and Level of Safety    Hearing: Hearing is good. Activities of Daily Living: The home contains: handrails  Patient does total self care      Ambulation: with no difficulty     Fall Risk:  Fall Risk Assessment, last 12 mths 7/7/2022   Able to walk? Yes   Fall in past 12 months? 0   Do you feel unsteady?  0   Are you worried about falling 0      Abuse Screen:  Patient is not abused       Cognitive Screening    Has your family/caregiver stated any concerns about your memory: yes - dx memory impairment     Cognitive Screening: Abnormal - Mini Cog Test    Health Maintenance Due     Health Maintenance Due   Topic Date Due    Shingrix Vaccine Age 49> (1 of 2) Never done    Pneumococcal 65+ years (2 - PPSV23 or PCV20) 11/09/2019    COVID-19 Vaccine (3 - Booster for [employer] series) 10/03/2021       Patient Care Team   Patient Care Team:  Wade Bradley NP as PCP - General (Physician Assistant)  Wade Bradley NP as PCP [facility]    History     Patient Active Problem List   Diagnosis Code    Renal stones N20.0    Renal disease N28.9    Hypothyroid E03.9    Gastroesophageal reflux disease K21.9    Essential hypertension I10    Coronary arteriosclerosis I25.10    History of coronary artery stent placement Z95.5    History of MI (myocardial infarction) I25.2    Edema R60.9    Acute on chronic diastolic congestive heart failure (HCC) I50.33    Dyslipidemia E78.5    Abdominal aortic aneurysm (AAA) without rupture ([address] I71.4     Past Medical History:   Diagnosis Date    Cerebrovascular accident (CVA) ([facility] [address] late 80s    TIA    Coronary arteriosclerosis     Essential hypertension     Gastroesophageal reflux disease     Heart disease     Hyperlipidemia     Hypothyroid     Myocardial infarction [facility])     Renal disease     Renal stones       Past Surgical History:   Procedure Laterality Date    HX CORONARY STENT PLACEMENT  2007    HX HEART CATHETERIZATION      HX HERNIA REPAIR      HX OTHER SURGICAL  90s    b/l renal stents    HX UROLOGICAL      cystoscopy with bilateral stent placement     Current Outpatient Medications   Medication Sig Dispense Refill    chlorhexidine (PERIDEX) 0.12 % solution 15 mL by Swish and Spit route every twelve (12) hours for 14 days. 420 mL 0    memantine (NAMENDA) 10 mg tablet Take 1 Tablet by mouth two (2) times a day. 90 Tablet 1    atorvastatin (LIPITOR) 20 mg tablet Take 1 Tablet by mouth daily.  amLODIPine (NORVASC) 5 mg tablet       tamsulosin (FLOMAX) 0.4 mg capsule Take 0.4 mg by mouth daily.  levothyroxine (SYNTHROID) 50 mcg tablet TAKE 1 TABLET EVERY DAY 90 Tab 2    losartan (COZAAR) 50 mg tablet TAKE 1 TABLET BY MOUTH DAILY FOR CHRONIC HEART FAILURE 90 Tab 1    clopidogrel (PLAVIX) 75 mg tab Take 1 Tab by mouth daily. 90 Tab 1    furosemide (LASIX) 40 mg tablet Take 1 Tab by mouth daily as needed. 90 Tab 1    omeprazole (PRILOSEC) 40 mg capsule TK ONE C PO  D  3    aspirin delayed-release 81 mg tablet Take 81 mg by mouth.        No Known Allergies    Family History   Problem Relation Age of Onset    Heart Attack Neg Hx     Stroke Neg Hx      Social History     Tobacco Use    Smoking status: Former Smoker     Types: Cigarettes     Quit date: 9/7/2006     Years since quitting: 15.8    Smokeless tobacco: Never Used   Substance Use Topics    Alcohol use: No         Kenneth Houston NP

## 2022-07-09 LAB
ALBUMIN SERPL-MCNC: 4.6 G/DL (ref 3.6–4.6)
ALBUMIN/GLOB SERPL: 2.1 {RATIO} (ref 1.2–2.2)
ALP SERPL-CCNC: 98 IU/L (ref 44–121)
ALT SERPL-CCNC: 19 IU/L (ref 0–44)
APPEARANCE UR: CLEAR
AST SERPL-CCNC: 29 IU/L (ref 0–40)
BASOPHILS # BLD AUTO: 0 X10E3/UL (ref 0–0.2)
BASOPHILS NFR BLD AUTO: 1 %
BILIRUB SERPL-MCNC: 0.7 MG/DL (ref 0–1.2)
BILIRUB UR QL STRIP: NEGATIVE
BUN SERPL-MCNC: 23 MG/DL (ref 8–27)
BUN/CREAT SERPL: 18 (ref 10–24)
CALCIUM SERPL-MCNC: 9.6 MG/DL (ref 8.6–10.2)
CHLORIDE SERPL-SCNC: 104 MMOL/L (ref 96–106)
CHOLEST SERPL-MCNC: 95 MG/DL (ref 100–199)
CO2 SERPL-SCNC: 26 MMOL/L (ref 20–29)
COLOR UR: YELLOW
CREAT SERPL-MCNC: 1.27 MG/DL (ref 0.76–1.27)
EGFR: 56 ML/MIN/1.73
EOSINOPHIL # BLD AUTO: 0 X10E3/UL (ref 0–0.4)
EOSINOPHIL NFR BLD AUTO: 1 %
ERYTHROCYTE [DISTWIDTH] IN BLOOD BY AUTOMATED COUNT: 14.1 % (ref 11.6–15.4)
GLOBULIN SER CALC-MCNC: 2.2 G/DL (ref 1.5–4.5)
GLUCOSE SERPL-MCNC: 98 MG/DL (ref 65–99)
GLUCOSE UR QL STRIP: NEGATIVE
HCT VFR BLD AUTO: 39.1 % (ref 37.5–51)
HDLC SERPL-MCNC: 44 MG/DL
HGB BLD-MCNC: 13 G/DL (ref 13–17.7)
HGB UR QL STRIP: NEGATIVE
IMM GRANULOCYTES # BLD AUTO: 0 X10E3/UL (ref 0–0.1)
IMM GRANULOCYTES NFR BLD AUTO: 0 %
KETONES UR QL STRIP: NEGATIVE
LDLC SERPL CALC-MCNC: 41 MG/DL (ref 0–99)
LEUKOCYTE ESTERASE UR QL STRIP: NEGATIVE
LYMPHOCYTES # BLD AUTO: 0.8 X10E3/UL (ref 0.7–3.1)
LYMPHOCYTES NFR BLD AUTO: 26 %
MCH RBC QN AUTO: 31.9 PG (ref 26.6–33)
MCHC RBC AUTO-ENTMCNC: 33.2 G/DL (ref 31.5–35.7)
MCV RBC AUTO: 96 FL (ref 79–97)
MICRO URNS: NORMAL
MONOCYTES # BLD AUTO: 0.7 X10E3/UL (ref 0.1–0.9)
MONOCYTES NFR BLD AUTO: 22 %
MORPHOLOGY BLD-IMP: ABNORMAL
NEUTROPHILS # BLD AUTO: 1.5 X10E3/UL (ref 1.4–7)
NEUTROPHILS NFR BLD AUTO: 50 %
NITRITE UR QL STRIP: NEGATIVE
PH UR STRIP: 5.5 [PH] (ref 5–7.5)
PLATELET # BLD AUTO: 60 X10E3/UL (ref 150–450)
POTASSIUM SERPL-SCNC: 4.9 MMOL/L (ref 3.5–5.2)
PROT SERPL-MCNC: 6.8 G/DL (ref 6–8.5)
PROT UR QL STRIP: NEGATIVE
RBC # BLD AUTO: 4.08 X10E6/UL (ref 4.14–5.8)
SODIUM SERPL-SCNC: 141 MMOL/L (ref 134–144)
SP GR UR STRIP: 1.02 (ref 1–1.03)
TRIGL SERPL-MCNC: 34 MG/DL (ref 0–149)
TSH SERPL DL<=0.005 MIU/L-ACNC: 4.45 UIU/ML (ref 0.45–4.5)
UROBILINOGEN UR STRIP-MCNC: 0.2 MG/DL (ref 0.2–1)
VLDLC SERPL CALC-MCNC: 10 MG/DL (ref 5–40)
WBC # BLD AUTO: 3 X10E3/UL (ref 3.4–10.8)

## 2022-07-11 DIAGNOSIS — K12.1 STOMATITIS: ICD-10-CM

## 2022-07-11 RX ORDER — CHLORHEXIDINE GLUCONATE 1.2 MG/ML
15 RINSE ORAL EVERY 12 HOURS
Qty: 30 EACH | Refills: 0 | Status: SHIPPED | OUTPATIENT
Start: 2022-07-11 | End: 2022-07-25

## 2022-07-11 NOTE — TELEPHONE ENCOUNTER
Received fax from pharmacy dose needs to be changed only comes in packets.  Will pend to you correctly

## 2022-07-11 NOTE — PROGRESS NOTES
Please let patient know that platelet level is showing very low. Do not believe likely accurate as appears platelets clumping but want him to come today to have repeated. Otherwise labs are stable. If any questions, let me know.

## 2022-07-18 NOTE — PROGRESS NOTES
Informed DIANE Heredia NP on 07/15/2022 that have been unable to reach patient. Will send him letter. She stated to document. Unable to reach patient this AM.  Letter to patient.

## 2022-08-08 ENCOUNTER — TELEPHONE (OUTPATIENT)
Dept: PRIMARY CARE CLINIC | Age: 82
End: 2022-08-08

## 2022-08-08 DIAGNOSIS — R68.2 DRY MOUTH: Primary | ICD-10-CM

## 2022-08-09 RX ORDER — CHLORHEXIDINE GLUCONATE 1.2 MG/ML
15 RINSE ORAL EVERY 12 HOURS
Qty: 420 ML | Refills: 0 | Status: SHIPPED | OUTPATIENT
Start: 2022-08-09 | End: 2022-08-23

## 2022-10-04 DIAGNOSIS — R41.3 MEMORY CHANGES: ICD-10-CM

## 2022-10-04 DIAGNOSIS — R68.2 DRY MOUTH: ICD-10-CM

## 2022-10-04 DIAGNOSIS — I10 PRIMARY HYPERTENSION: Primary | ICD-10-CM

## 2022-10-04 DIAGNOSIS — Z95.5 HX OF RIGHT CORONARY ARTERY STENT PLACEMENT: ICD-10-CM

## 2022-10-04 RX ORDER — CHLORHEXIDINE GLUCONATE 1.2 MG/ML
15 RINSE ORAL EVERY 12 HOURS
Qty: 900 ML | Refills: 5 | Status: SHIPPED | OUTPATIENT
Start: 2022-10-04

## 2022-10-04 RX ORDER — AMLODIPINE BESYLATE 5 MG/1
5 TABLET ORAL DAILY
Qty: 90 TABLET | Refills: 1 | Status: SHIPPED | OUTPATIENT
Start: 2022-10-04

## 2022-10-04 RX ORDER — MEMANTINE HYDROCHLORIDE 10 MG/1
10 TABLET ORAL 2 TIMES DAILY
Qty: 90 TABLET | Refills: 1 | Status: SHIPPED | OUTPATIENT
Start: 2022-10-04

## 2022-10-04 RX ORDER — CLOPIDOGREL BISULFATE 75 MG/1
75 TABLET ORAL DAILY
Qty: 90 TABLET | Refills: 1 | Status: SHIPPED | OUTPATIENT
Start: 2022-10-04

## 2022-10-04 NOTE — TELEPHONE ENCOUNTER
Pt needs memantine HCL tab 10 mg, amlopidine 5 mg, clopidogrel 75 mg, chlorhexidine ,  wants to know if the chlorhexidine can be on a repeat to get every 30 days.

## 2022-10-10 ENCOUNTER — OFFICE VISIT (OUTPATIENT)
Dept: PRIMARY CARE CLINIC | Age: 82
End: 2022-10-10
Payer: MEDICARE

## 2022-10-10 ENCOUNTER — TELEPHONE (OUTPATIENT)
Dept: PRIMARY CARE CLINIC | Age: 82
End: 2022-10-10

## 2022-10-10 VITALS
RESPIRATION RATE: 18 BRPM | SYSTOLIC BLOOD PRESSURE: 136 MMHG | WEIGHT: 154.2 LBS | OXYGEN SATURATION: 98 % | HEIGHT: 70 IN | BODY MASS INDEX: 22.07 KG/M2 | TEMPERATURE: 97.7 F | DIASTOLIC BLOOD PRESSURE: 89 MMHG | HEART RATE: 84 BPM

## 2022-10-10 DIAGNOSIS — I25.810 CORONARY ARTERY DISEASE INVOLVING CORONARY BYPASS GRAFT OF NATIVE HEART WITHOUT ANGINA PECTORIS: ICD-10-CM

## 2022-10-10 DIAGNOSIS — K59.00 CONSTIPATION, UNSPECIFIED CONSTIPATION TYPE: ICD-10-CM

## 2022-10-10 DIAGNOSIS — K21.9 GASTROESOPHAGEAL REFLUX DISEASE WITHOUT ESOPHAGITIS: ICD-10-CM

## 2022-10-10 DIAGNOSIS — R10.13 EPIGASTRIC PAIN: Primary | ICD-10-CM

## 2022-10-10 DIAGNOSIS — Z23 NEEDS FLU SHOT: ICD-10-CM

## 2022-10-10 PROCEDURE — G8536 NO DOC ELDER MAL SCRN: HCPCS | Performed by: NURSE PRACTITIONER

## 2022-10-10 PROCEDURE — G8432 DEP SCR NOT DOC, RNG: HCPCS | Performed by: NURSE PRACTITIONER

## 2022-10-10 PROCEDURE — G8427 DOCREV CUR MEDS BY ELIG CLIN: HCPCS | Performed by: NURSE PRACTITIONER

## 2022-10-10 PROCEDURE — G0008 ADMIN INFLUENZA VIRUS VAC: HCPCS | Performed by: NURSE PRACTITIONER

## 2022-10-10 PROCEDURE — 99214 OFFICE O/P EST MOD 30 MIN: CPT | Performed by: NURSE PRACTITIONER

## 2022-10-10 PROCEDURE — 90694 VACC AIIV4 NO PRSRV 0.5ML IM: CPT | Performed by: NURSE PRACTITIONER

## 2022-10-10 PROCEDURE — G8420 CALC BMI NORM PARAMETERS: HCPCS | Performed by: NURSE PRACTITIONER

## 2022-10-10 PROCEDURE — G8752 SYS BP LESS 140: HCPCS | Performed by: NURSE PRACTITIONER

## 2022-10-10 PROCEDURE — 1123F ACP DISCUSS/DSCN MKR DOCD: CPT | Performed by: NURSE PRACTITIONER

## 2022-10-10 PROCEDURE — 1101F PT FALLS ASSESS-DOCD LE1/YR: CPT | Performed by: NURSE PRACTITIONER

## 2022-10-10 PROCEDURE — G8754 DIAS BP LESS 90: HCPCS | Performed by: NURSE PRACTITIONER

## 2022-10-10 RX ORDER — OMEPRAZOLE 40 MG/1
40 CAPSULE, DELAYED RELEASE ORAL DAILY
Qty: 90 CAPSULE | Refills: 1 | Status: SHIPPED | OUTPATIENT
Start: 2022-10-10

## 2022-10-10 NOTE — PROGRESS NOTES
Maynor Persaud is a 80 y.o. male who presents to the office today for the following:    Chief Complaint   Patient presents with    Epigastric Pain    GERD       Past Medical History:   Diagnosis Date    Cerebrovascular accident (CVA) ([facility] [address] late 80s    TIA    Coronary arteriosclerosis     Essential hypertension     Gastroesophageal reflux disease     Heart disease     Hyperlipidemia     Hypothyroid     Myocardial infarction ([facility] [address]     Renal disease     Renal stones        Past Surgical History:   Procedure Laterality Date    HX CORONARY STENT PLACEMENT      HX HEART CATHETERIZATION      HX HERNIA REPAIR      HX OTHER SURGICAL  90s    b/l renal stents    HX UROLOGICAL      cystoscopy with bilateral stent placement        Family History   Problem Relation Age of Onset    Heart Attack Neg Hx     Stroke Neg Hx         Social History     Tobacco Use    Smoking status: Former     Types: Cigarettes     Quit date: 2006     Years since quittin.1    Smokeless tobacco: Never   Substance Use Topics    Alcohol use: No        HPI  Patient with PMH of CAD, hypertension, hyperlipidemia, hypothyroidism, GERD, CHF, TIA, BPH , memory changes and Abdominal aneurysm who presents with concerns regarding epigastric pain that started about 2 days ago but been off and on for several years. States that he had eaten his dinner and felt like something got \"stuck\" in epigastric region. Read online that he should eat something soft and drink fluids and did eventually go down a while later. Since then has had pain in the epigastric region especially after eating. Denies any frequent burping but did feel nauseated a few times. No frequent choking or coughing after meals. Told by cardiologist in the past that he may have problems with esophagus as this \"shrinks\" as you age due to being a past smoker. Denies any diarrhea or blood in stool. Does report problems with constipation off and on so takes miralax occasionally.  Last BM was 2 days ago. Also would like to see about getting referral to new cardiologist. Does not want to [facility] to [facility]. Current Outpatient Medications on File Prior to Visit   Medication Sig    memantine (NAMENDA) 10 mg tablet Take 1 Tablet by mouth two (2) times a day. amLODIPine (NORVASC) 5 mg tablet Take 1 Tablet by mouth daily. clopidogreL (Plavix) 75 mg tab Take 1 Tablet by mouth daily. chlorhexidine (PERIDEX) 0.12 % solution 15 mL by Swish and Spit route every twelve (12) hours. atorvastatin (LIPITOR) 20 mg tablet Take 1 Tablet by mouth daily. tamsulosin (FLOMAX) 0.4 mg capsule Take 0.4 mg by mouth daily. levothyroxine (SYNTHROID) 50 mcg tablet TAKE 1 TABLET EVERY DAY    losartan (COZAAR) 50 mg tablet TAKE 1 TABLET BY MOUTH DAILY FOR CHRONIC HEART FAILURE    furosemide (LASIX) 40 mg tablet Take 1 Tab by mouth daily as needed. aspirin delayed-release 81 mg tablet Take 81 mg by mouth. [DISCONTINUED] omeprazole (PRILOSEC) 40 mg capsule TK ONE C PO  D     No current facility-administered medications on file prior to visit. Medications Ordered Today   Medications    omeprazole (PRILOSEC) 40 mg capsule     Sig: Take 1 Capsule by mouth daily. Dispense:  90 Capsule     Refill:  1        Review of Systems   Constitutional:  Negative for chills, fever, malaise/fatigue and weight loss. Respiratory: Negative. Cardiovascular: Negative. Gastrointestinal:  Positive for abdominal pain, constipation, heartburn and nausea. Negative for blood in stool, diarrhea and vomiting. Genitourinary: Negative. Musculoskeletal:  Positive for myalgias. Neurological:  Negative for dizziness, loss of consciousness, weakness and headaches.        Visit Vitals  /89 (BP 1 Location: Left upper arm, BP Patient Position: Sitting, BP Cuff Size: Adult)   Pulse 84   Temp 97.7 °F (36.5 °C) (Temporal)   Resp 18   Ht 5' 10\" (1.778 m)   Wt 154 lb 3.2 oz (69.9 kg)   SpO2 98%   BMI 22.13 kg/m² Physical Exam  Vitals and nursing note reviewed. Constitutional:       Appearance: Normal appearance. Cardiovascular:      Rate and Rhythm: Normal rate and regular rhythm. Pulses: Normal pulses. Pulmonary:      Effort: Pulmonary effort is normal.      Breath sounds: Normal breath sounds. Abdominal:      Tenderness: There is abdominal tenderness (mild epigastric). There is no guarding or rebound. Hernia: No hernia is present. Musculoskeletal:      Right lower leg: No edema. Left lower leg: No edema. Skin:     General: Skin is warm and dry. Neurological:      Mental Status: He is alert and oriented to person, place, and time. Mental status is at baseline. 1. Epigastric pain  Check labs  Start on prilosec 40mg daily as directed and reviewed use/side effects  Denies use of  NSAIDS  Avoid foods that worsen symptoms, elevate HOB prn, eat smaller, more freuquent meals, avoid laying flat 1 hour after meal  Referring to GI to evaluate   Advised if develops any increasing abdominal pain, fever or other worsening condition, follow up immediately or seek emergency care if needed  - CBC WITH AUTOMATED DIFF  - METABOLIC PANEL, COMPREHENSIVE  - LIPASE  - REFERRAL TO GASTROENTEROLOGY    2. Gastroesophageal reflux disease without esophagitis  See above  - REFERRAL TO GASTROENTEROLOGY    3. Constipation, unspecified constipation type  Recommend taking miralax every other day or every third day to avoid constipation   Encourage increased fiber (diet and/or supplements such as metamucil, fiber gummies), water and exercise     4. Coronary artery disease involving coronary bypass graft of native heart without angina pectoris  He does not want to continue care with [facility] and did not attend last recommended follow up.   Will send new referral today to Dr. Krista Laws    5. Needs flu shot  Update annual flu shot  - INFLUENZA, FLUAD, (AGE 65 Y+), IM, PF, 0.5 ML      We discussed the expected course, resolution and complications of the diagnosis(es) in detail. Medication risks, benefits, costs, interactions, and alternatives were discussed as indicated. I advised him to contact the office if his condition worsens, changes or fails to improve as anticipated. He expressed understanding with the diagnosis(es) and plan. Follow-up and Dispositions    Return if symptoms worsen or fail to improve.

## 2022-10-10 NOTE — PROGRESS NOTES
Chief Complaint   Patient presents with    Epigastric Pain    GERD     Been going on for years, but getting worse and per pt it is things that he is eating that makes it worse     1. Have you been to the ER, urgent care clinic since your last visit? Hospitalized since your last visit? No    2. Have you seen or consulted any other health care providers outside of the [address] [place] [address] since your last visit? Include any pap smears or colon screening.  No

## 2022-10-27 ENCOUNTER — TELEPHONE (OUTPATIENT)
Dept: PRIMARY CARE CLINIC | Age: 82
End: 2022-10-27

## 2022-11-29 ENCOUNTER — TELEPHONE (OUTPATIENT)
Dept: PRIMARY CARE CLINIC | Age: 82
End: 2022-11-29

## 2022-11-30 ENCOUNTER — TELEPHONE (OUTPATIENT)
Dept: PRIMARY CARE CLINIC | Age: 82
End: 2022-11-30

## 2023-01-23 LAB
LEFT VENTRICULAR EJECTION FRACTION HIGH VALUE: 45 %
LV EF: 40 %

## 2023-05-24 RX ORDER — MEMANTINE HYDROCHLORIDE 10 MG/1
10 TABLET ORAL 2 TIMES DAILY
COMMUNITY
Start: 2022-10-04 | End: 2023-06-30 | Stop reason: SDUPTHER

## 2023-05-24 RX ORDER — AMLODIPINE BESYLATE 5 MG/1
5 TABLET ORAL DAILY
COMMUNITY
Start: 2022-10-04 | End: 2023-06-30 | Stop reason: SDUPTHER

## 2023-05-24 RX ORDER — FUROSEMIDE 40 MG/1
40 TABLET ORAL DAILY PRN
COMMUNITY
Start: 2018-01-08

## 2023-05-24 RX ORDER — OMEPRAZOLE 40 MG/1
40 CAPSULE, DELAYED RELEASE ORAL DAILY
COMMUNITY
Start: 2022-10-10

## 2023-05-24 RX ORDER — ATORVASTATIN CALCIUM 20 MG/1
1 TABLET, FILM COATED ORAL DAILY
COMMUNITY

## 2023-05-24 RX ORDER — TAMSULOSIN HYDROCHLORIDE 0.4 MG/1
0.4 CAPSULE ORAL DAILY
COMMUNITY
End: 2023-06-02 | Stop reason: SDUPTHER

## 2023-05-24 RX ORDER — LEVOTHYROXINE SODIUM 0.05 MG/1
1 TABLET ORAL DAILY
COMMUNITY
Start: 2021-04-30 | End: 2023-06-05 | Stop reason: SDUPTHER

## 2023-05-24 RX ORDER — CLOPIDOGREL BISULFATE 75 MG/1
75 TABLET ORAL DAILY
COMMUNITY
Start: 2022-10-04

## 2023-05-24 RX ORDER — CHLORHEXIDINE GLUCONATE 0.12 MG/ML
15 RINSE ORAL EVERY 12 HOURS
COMMUNITY
Start: 2022-10-04 | End: 2023-06-02 | Stop reason: SDUPTHER

## 2023-05-24 RX ORDER — LOSARTAN POTASSIUM 50 MG/1
TABLET ORAL
COMMUNITY
Start: 2018-08-17 | End: 2023-06-02 | Stop reason: SDUPTHER

## 2023-05-24 RX ORDER — ASPIRIN 81 MG/1
81 TABLET ORAL
COMMUNITY

## 2023-06-02 ENCOUNTER — OFFICE VISIT (OUTPATIENT)
Facility: CLINIC | Age: 83
End: 2023-06-02
Payer: MEDICARE

## 2023-06-02 VITALS
DIASTOLIC BLOOD PRESSURE: 80 MMHG | HEART RATE: 60 BPM | OXYGEN SATURATION: 97 % | BODY MASS INDEX: 21.81 KG/M2 | TEMPERATURE: 97.8 F | HEIGHT: 72 IN | RESPIRATION RATE: 18 BRPM | SYSTOLIC BLOOD PRESSURE: 154 MMHG | WEIGHT: 161 LBS

## 2023-06-02 DIAGNOSIS — I10 ESSENTIAL HYPERTENSION: Primary | ICD-10-CM

## 2023-06-02 DIAGNOSIS — I71.43 INFRARENAL ABDOMINAL AORTIC ANEURYSM (AAA) WITHOUT RUPTURE (HCC): ICD-10-CM

## 2023-06-02 DIAGNOSIS — I25.2 HISTORY OF MI (MYOCARDIAL INFARCTION): ICD-10-CM

## 2023-06-02 DIAGNOSIS — L29.9 ITCHING: ICD-10-CM

## 2023-06-02 DIAGNOSIS — K21.9 GASTROESOPHAGEAL REFLUX DISEASE, UNSPECIFIED WHETHER ESOPHAGITIS PRESENT: ICD-10-CM

## 2023-06-02 DIAGNOSIS — Z71.89 ENCOUNTER FOR COUNSELING REGARDING ADVANCE DIRECTIVES: ICD-10-CM

## 2023-06-02 DIAGNOSIS — E03.9 HYPOTHYROIDISM, UNSPECIFIED TYPE: ICD-10-CM

## 2023-06-02 DIAGNOSIS — R97.20 ELEVATED PSA: ICD-10-CM

## 2023-06-02 DIAGNOSIS — D69.6 THROMBOCYTOPENIA (HCC): Primary | ICD-10-CM

## 2023-06-02 DIAGNOSIS — I10 ESSENTIAL HYPERTENSION: ICD-10-CM

## 2023-06-02 DIAGNOSIS — E78.5 DYSLIPIDEMIA: ICD-10-CM

## 2023-06-02 DIAGNOSIS — Z95.5 HISTORY OF CORONARY ARTERY STENT PLACEMENT: ICD-10-CM

## 2023-06-02 PROCEDURE — 3078F DIAST BP <80 MM HG: CPT | Performed by: STUDENT IN AN ORGANIZED HEALTH CARE EDUCATION/TRAINING PROGRAM

## 2023-06-02 PROCEDURE — 1123F ACP DISCUSS/DSCN MKR DOCD: CPT | Performed by: STUDENT IN AN ORGANIZED HEALTH CARE EDUCATION/TRAINING PROGRAM

## 2023-06-02 PROCEDURE — 99204 OFFICE O/P NEW MOD 45 MIN: CPT | Performed by: STUDENT IN AN ORGANIZED HEALTH CARE EDUCATION/TRAINING PROGRAM

## 2023-06-02 PROCEDURE — 3077F SYST BP >= 140 MM HG: CPT | Performed by: STUDENT IN AN ORGANIZED HEALTH CARE EDUCATION/TRAINING PROGRAM

## 2023-06-02 RX ORDER — CHLORHEXIDINE GLUCONATE 0.12 MG/ML
15 RINSE ORAL EVERY 12 HOURS
Qty: 1 EACH | Refills: 2 | Status: SHIPPED | OUTPATIENT
Start: 2023-06-02

## 2023-06-02 RX ORDER — LOSARTAN POTASSIUM 100 MG/1
100 TABLET ORAL DAILY
Qty: 90 TABLET | Refills: 1 | Status: SHIPPED | OUTPATIENT
Start: 2023-06-02

## 2023-06-02 RX ORDER — CARVEDILOL 6.25 MG/1
6.25 TABLET ORAL 2 TIMES DAILY
COMMUNITY

## 2023-06-02 RX ORDER — TAMSULOSIN HYDROCHLORIDE 0.4 MG/1
0.4 CAPSULE ORAL DAILY
Qty: 30 CAPSULE | Refills: 1 | Status: SHIPPED | OUTPATIENT
Start: 2023-06-02

## 2023-06-02 RX ORDER — SPIRONOLACTONE 25 MG/1
25 TABLET ORAL DAILY
COMMUNITY

## 2023-06-02 SDOH — ECONOMIC STABILITY: HOUSING INSECURITY
IN THE LAST 12 MONTHS, WAS THERE A TIME WHEN YOU DID NOT HAVE A STEADY PLACE TO SLEEP OR SLEPT IN A SHELTER (INCLUDING NOW)?: NO

## 2023-06-02 SDOH — ECONOMIC STABILITY: INCOME INSECURITY: HOW HARD IS IT FOR YOU TO PAY FOR THE VERY BASICS LIKE FOOD, HOUSING, MEDICAL CARE, AND HEATING?: SOMEWHAT HARD

## 2023-06-02 SDOH — ECONOMIC STABILITY: FOOD INSECURITY: WITHIN THE PAST 12 MONTHS, YOU WORRIED THAT YOUR FOOD WOULD RUN OUT BEFORE YOU GOT MONEY TO BUY MORE.: NEVER TRUE

## 2023-06-02 SDOH — ECONOMIC STABILITY: INCOME INSECURITY: IN THE LAST 12 MONTHS, WAS THERE A TIME WHEN YOU WERE NOT ABLE TO PAY THE MORTGAGE OR RENT ON TIME?: NO

## 2023-06-02 SDOH — ECONOMIC STABILITY: HOUSING INSECURITY: IN THE LAST 12 MONTHS, HOW MANY PLACES HAVE YOU LIVED?: 1

## 2023-06-02 SDOH — HEALTH STABILITY: PHYSICAL HEALTH: ON AVERAGE, HOW MANY MINUTES DO YOU ENGAGE IN EXERCISE AT THIS LEVEL?: 0 MIN

## 2023-06-02 SDOH — HEALTH STABILITY: PHYSICAL HEALTH: ON AVERAGE, HOW MANY DAYS PER WEEK DO YOU ENGAGE IN MODERATE TO STRENUOUS EXERCISE (LIKE A BRISK WALK)?: 0 DAYS

## 2023-06-02 SDOH — ECONOMIC STABILITY: TRANSPORTATION INSECURITY
IN THE PAST 12 MONTHS, HAS THE LACK OF TRANSPORTATION KEPT YOU FROM MEDICAL APPOINTMENTS OR FROM GETTING MEDICATIONS?: NO

## 2023-06-02 SDOH — ECONOMIC STABILITY: TRANSPORTATION INSECURITY
IN THE PAST 12 MONTHS, HAS LACK OF TRANSPORTATION KEPT YOU FROM MEETINGS, WORK, OR FROM GETTING THINGS NEEDED FOR DAILY LIVING?: NO

## 2023-06-02 SDOH — ECONOMIC STABILITY: FOOD INSECURITY: WITHIN THE PAST 12 MONTHS, THE FOOD YOU BOUGHT JUST DIDN'T LAST AND YOU DIDN'T HAVE MONEY TO GET MORE.: NEVER TRUE

## 2023-06-02 ASSESSMENT — PATIENT HEALTH QUESTIONNAIRE - PHQ9
7. TROUBLE CONCENTRATING ON THINGS, SUCH AS READING THE NEWSPAPER OR WATCHING TELEVISION: 0
6. FEELING BAD ABOUT YOURSELF - OR THAT YOU ARE A FAILURE OR HAVE LET YOURSELF OR YOUR FAMILY DOWN: 0
8. MOVING OR SPEAKING SO SLOWLY THAT OTHER PEOPLE COULD HAVE NOTICED. OR THE OPPOSITE, BEING SO FIGETY OR RESTLESS THAT YOU HAVE BEEN MOVING AROUND A LOT MORE THAN USUAL: 0
SUM OF ALL RESPONSES TO PHQ9 QUESTIONS 1 & 2: 0
SUM OF ALL RESPONSES TO PHQ QUESTIONS 1-9: 0
5. POOR APPETITE OR OVEREATING: 0
10. IF YOU CHECKED OFF ANY PROBLEMS, HOW DIFFICULT HAVE THESE PROBLEMS MADE IT FOR YOU TO DO YOUR WORK, TAKE CARE OF THINGS AT HOME, OR GET ALONG WITH OTHER PEOPLE: 0
9. THOUGHTS THAT YOU WOULD BE BETTER OFF DEAD, OR OF HURTING YOURSELF: 0
3. TROUBLE FALLING OR STAYING ASLEEP: 0
2. FEELING DOWN, DEPRESSED OR HOPELESS: 0
SUM OF ALL RESPONSES TO PHQ QUESTIONS 1-9: 0
4. FEELING TIRED OR HAVING LITTLE ENERGY: 0
1. LITTLE INTEREST OR PLEASURE IN DOING THINGS: 0

## 2023-06-02 ASSESSMENT — ANXIETY QUESTIONNAIRES
GAD7 TOTAL SCORE: 0
2. NOT BEING ABLE TO STOP OR CONTROL WORRYING: 0
1. FEELING NERVOUS, ANXIOUS, OR ON EDGE: 0
4. TROUBLE RELAXING: 0
7. FEELING AFRAID AS IF SOMETHING AWFUL MIGHT HAPPEN: 0
3. WORRYING TOO MUCH ABOUT DIFFERENT THINGS: 0
IF YOU CHECKED OFF ANY PROBLEMS ON THIS QUESTIONNAIRE, HOW DIFFICULT HAVE THESE PROBLEMS MADE IT FOR YOU TO DO YOUR WORK, TAKE CARE OF THINGS AT HOME, OR GET ALONG WITH OTHER PEOPLE: NOT DIFFICULT AT ALL
5. BEING SO RESTLESS THAT IT IS HARD TO SIT STILL: 0
6. BECOMING EASILY ANNOYED OR IRRITABLE: 0

## 2023-06-02 ASSESSMENT — ENCOUNTER SYMPTOMS
SHORTNESS OF BREATH: 0
ABDOMINAL PAIN: 0
FACIAL SWELLING: 0
CONSTIPATION: 0
SORE THROAT: 0
DIARRHEA: 0
COUGH: 0

## 2023-06-02 ASSESSMENT — LIFESTYLE VARIABLES
HOW MANY STANDARD DRINKS CONTAINING ALCOHOL DO YOU HAVE ON A TYPICAL DAY: PATIENT DOES NOT DRINK
HOW OFTEN DO YOU HAVE A DRINK CONTAINING ALCOHOL: NEVER

## 2023-06-02 NOTE — PATIENT INSTRUCTIONS
Make appointment with Dr. Gabo Garcia, urology. Your losartan dose has been increased to 100 mg, monitor Blood pressures.

## 2023-06-02 NOTE — PROGRESS NOTES
1. \"Have you been to the ER, urgent care clinic since your last visit? Hospitalized since your last visit? \" No    2. \"Have you seen or consulted any other health care providers outside of the [address] [place] [address] since your last visit? \" Yes When: 4 weeks ago Where: Cardiologist- Reason for visit: Follow up      3. For patients aged 39-70: Has the patient had a colonoscopy / FIT/ Cologuard? NA - based on age      If the patient is female:    4. For patients aged 41-77: Has the patient had a mammogram within the past 2 years? NA - based on age or sex      11. For patients aged 21-65: Has the patient had a pap smear?  NA - based on age or sex    Chief Complaint   Patient presents with    New Patient    Hypertension    Cholesterol Problem    Leg Pain    Pruritis    Gastroesophageal Reflux    Hypothyroidism    Other     History of stroke in 1990  History of a heart attack in 2007 stent placed in heart      BP (!) 150/73 (Site: Left Upper Arm, Position: Sitting, Cuff Size: Small Adult)   Pulse 60   Temp 97.8 °F (36.6 °C) (Oral)   Resp 18   Ht 6' (1.829 m)   Wt 161 lb (73 kg)   SpO2 97%   BMI 21.84 kg/m²     Previous PCP- [facility] [address] [facility]    Cardiologist- Dr. Jesse Brewer
Laney Gonzalez (: 1940) is a 80 y.o. male is a new patient and presents to the clinic to establish care. Previous PCP:     ASSESSMENT/PLAN:  Below is the assessment and plan developed based on review of pertinent history, physical exam, labs, studies, and medications. {There are no diagnoses linked to this encounter. (Refresh or delete this SmartLink)}    ***    No follow-ups on file. SUBJECTIVE/OBJECTIVE:  HPI    Laney Gonzalez is an 80-year-old who presents to clinic to establish care. ***  Not on File  Current Outpatient Medications   Medication Sig Dispense Refill    amLODIPine (NORVASC) 5 MG tablet Take 1 tablet by mouth daily      aspirin 81 MG EC tablet Take 1 tablet by mouth      atorvastatin (LIPITOR) 20 MG tablet Take 1 tablet by mouth daily      chlorhexidine (PERIDEX) 0.12 % solution Take 15 mLs by mouth in the morning and 15 mLs in the evening. clopidogrel (PLAVIX) 75 MG tablet Take 1 tablet by mouth daily      furosemide (LASIX) 40 MG tablet Take 1 tablet by mouth daily as needed      levothyroxine (SYNTHROID) 50 MCG tablet Take 1 tablet by mouth daily      losartan (COZAAR) 50 MG tablet TAKE 1 TABLET BY MOUTH DAILY FOR CHRONIC HEART FAILURE      memantine (NAMENDA) 10 MG tablet Take 1 tablet by mouth 2 times daily      omeprazole (PRILOSEC) 40 MG delayed release capsule Take 1 capsule by mouth daily      tamsulosin (FLOMAX) 0.4 MG capsule Take 1 capsule by mouth daily       No current facility-administered medications for this visit.       Past Medical History:   Diagnosis Date    Cerebrovascular accident (CVA) ([facility] [address] late 80s    TIA    Coronary arteriosclerosis     Essential hypertension     Gastroesophageal reflux disease     Heart disease     Hyperlipidemia     Hypothyroid     Myocardial infarction [facility])     Renal disease     Renal stones      Past Surgical History:   Procedure Laterality Date    CARDIAC CATHETERIZATION      CORONARY ANGIOPLASTY WITH STENT
MG tablet Take 1 tablet by mouth 2 times daily      spironolactone (ALDACTONE) 25 MG tablet Take 1 tablet by mouth daily      tamsulosin (FLOMAX) 0.4 MG capsule Take 1 capsule by mouth daily 30 capsule 1    chlorhexidine (PERIDEX) 0.12 % solution Swish and spit 15 mLs  in the morning and 15 mLs in the evening. 1 each 2    losartan (COZAAR) 100 MG tablet Take 1 tablet by mouth daily 90 tablet 1    amLODIPine (NORVASC) 5 MG tablet Take 1 tablet by mouth daily      aspirin 81 MG EC tablet Take 1 tablet by mouth      atorvastatin (LIPITOR) 20 MG tablet Take 1 tablet by mouth daily      clopidogrel (PLAVIX) 75 MG tablet Take 1 tablet by mouth daily      levothyroxine (SYNTHROID) 50 MCG tablet Take 1 tablet by mouth daily      memantine (NAMENDA) 10 MG tablet Take 1 tablet by mouth 2 times daily      omeprazole (PRILOSEC) 40 MG delayed release capsule Take 1 capsule by mouth daily      furosemide (LASIX) 40 MG tablet Take 1 tablet by mouth daily as needed       No current facility-administered medications for this visit.       Past Medical History:   Diagnosis Date    Cerebrovascular accident (CVA) ([facility] [address] late 80s    TIA    Coronary arteriosclerosis     Essential hypertension     Gastroesophageal reflux disease     Heart disease     Hyperlipidemia     Hypothyroid     Myocardial infarction [facility])     Renal disease     Renal stones      Past Surgical History:   Procedure Laterality Date    CARDIAC CATHETERIZATION      CORONARY ANGIOPLASTY WITH STENT PLACEMENT  2007    HERNIA REPAIR      OTHER SURGICAL HISTORY  90s    b/l renal stents    UROLOGICAL SURGERY      cystoscopy with bilateral stent placement     Family History   Problem Relation Age of Onset    Cancer Mother     Heart Attack Father     Stroke Neg Hx      Social History     Tobacco Use   Smoking Status Former    Packs/day: 3.00    Years: 50.00    Pack years: 150.00    Types: Cigarettes    Quit date: 9/7/2007    Years since quitting: 15.7   Smokeless Tobacco Never

## 2023-06-03 LAB
ALBUMIN SERPL-MCNC: 5 G/DL (ref 3.6–4.6)
ALBUMIN/GLOB SERPL: 2 {RATIO} (ref 1.2–2.2)
ALP SERPL-CCNC: 90 IU/L (ref 44–121)
ALT SERPL-CCNC: 23 IU/L (ref 0–44)
AST SERPL-CCNC: 37 IU/L (ref 0–40)
BILIRUB SERPL-MCNC: 0.8 MG/DL (ref 0–1.2)
BUN SERPL-MCNC: 28 MG/DL (ref 8–27)
BUN/CREAT SERPL: 18 (ref 10–24)
CALCIUM SERPL-MCNC: 10 MG/DL (ref 8.6–10.2)
CHLORIDE SERPL-SCNC: 103 MMOL/L (ref 96–106)
CHOLEST SERPL-MCNC: 95 MG/DL (ref 100–199)
CO2 SERPL-SCNC: 18 MMOL/L (ref 20–29)
CREAT SERPL-MCNC: 1.55 MG/DL (ref 0.76–1.27)
EGFRCR SERPLBLD CKD-EPI 2021: 44 ML/MIN/1.73
ERYTHROCYTE [DISTWIDTH] IN BLOOD BY AUTOMATED COUNT: 13.7 % (ref 11.6–15.4)
GLOBULIN SER CALC-MCNC: 2.5 G/DL (ref 1.5–4.5)
GLUCOSE SERPL-MCNC: 92 MG/DL (ref 70–99)
HCT VFR BLD AUTO: 39.7 % (ref 37.5–51)
HDLC SERPL-MCNC: 48 MG/DL
HGB BLD-MCNC: 13.3 G/DL (ref 13–17.7)
LDLC SERPL CALC-MCNC: 35 MG/DL (ref 0–99)
MCH RBC QN AUTO: 31 PG (ref 26.6–33)
MCHC RBC AUTO-ENTMCNC: 33.5 G/DL (ref 31.5–35.7)
MCV RBC AUTO: 93 FL (ref 79–97)
MORPHOLOGY BLD-IMP: NORMAL
PLATELET # BLD AUTO: 48 X10E3/UL (ref 150–450)
POTASSIUM SERPL-SCNC: 5 MMOL/L (ref 3.5–5.2)
PROT SERPL-MCNC: 7.5 G/DL (ref 6–8.5)
RBC # BLD AUTO: 4.29 X10E6/UL (ref 4.14–5.8)
SODIUM SERPL-SCNC: 143 MMOL/L (ref 134–144)
T4 FREE SERPL-MCNC: 0.91 NG/DL (ref 0.82–1.77)
TRIGL SERPL-MCNC: 45 MG/DL (ref 0–149)
TSH SERPL DL<=0.005 MIU/L-ACNC: 14 UIU/ML (ref 0.45–4.5)
VLDLC SERPL CALC-MCNC: 12 MG/DL (ref 5–40)
WBC # BLD AUTO: 4.9 X10E3/UL (ref 3.4–10.8)

## 2023-06-05 ENCOUNTER — CLINICAL DOCUMENTATION (OUTPATIENT)
Dept: CASE MANAGEMENT | Age: 83
End: 2023-06-05

## 2023-06-05 DIAGNOSIS — D69.6 THROMBOCYTOPENIA (HCC): ICD-10-CM

## 2023-06-05 DIAGNOSIS — E03.9 HYPOTHYROIDISM, UNSPECIFIED TYPE: ICD-10-CM

## 2023-06-05 RX ORDER — LEVOTHYROXINE SODIUM 0.05 MG/1
50 TABLET ORAL DAILY
Qty: 30 TABLET | Refills: 2 | Status: SHIPPED | OUTPATIENT
Start: 2023-06-05

## 2023-06-05 NOTE — ACP (ADVANCE CARE PLANNING)
Advance Care Planning   Ambulatory ACP Specialist Patient Outreach    Date:  6/5/2023    ACP Specialist:  Na Bullock LCSW    Outreach call to patient in follow-up to ACP Specialist referral from: Tauna Sicard    [] PCP  [] Provider   [] Ambulatory Care Management [x] Other     For:                  [x] Advance Directive Assistance              [] Complete Portable DNR order              [] Complete POST/POLST/MOST              [] Code Status Discussion             [] Discuss Goals of Care             [] Early ACP Decision-Making              [] Other (Specify)    Date Referral Received:6/2/2023    Next Step:   [x] ACP scheduled conversation  [] Outreach again in one week               [x] Email / Mail [address]  [x] Email / Mail Advance Directive   [] Closing referral.  Routing closure to referring provider/staff and to ACP Specialist . [] Closure letter mailed to patient with invitation to contact ACP Specialist if / when ready. [] Other (Specify here): Outreaches:  [x] 1st -  Date:  6/5/2023               Intervention:  [x] Spoke with Patient   [] Left Voice mail [] Email / Mail    [] [employer]  [] Other [id number] : Outcomes: Outreach telephone call made to patient to offer an Advance Care Planning appointment. Pt is agreeable to participating in an ACP appointment and this has been scheduled for Wednesday, June 28, 2023 at 8:00am. Educational material to be mailed to patient's home address prior to this appointment date. Thank you for this referral.     Katya Becker.  ZAK Brownlee  Advance Care   [facility]  [phone]

## 2023-06-08 LAB
ALBUMIN SERPL-MCNC: 4.7 G/DL (ref 3.6–4.6)
ALBUMIN/GLOB SERPL: 1.9 {RATIO} (ref 1.2–2.2)
ALP SERPL-CCNC: 89 IU/L (ref 44–121)
ALT SERPL-CCNC: 21 IU/L (ref 0–44)
AST SERPL-CCNC: 34 IU/L (ref 0–40)
BILIRUB SERPL-MCNC: 0.6 MG/DL (ref 0–1.2)
BUN SERPL-MCNC: 29 MG/DL (ref 8–27)
BUN/CREAT SERPL: 21 (ref 10–24)
CALCIUM SERPL-MCNC: 9.7 MG/DL (ref 8.6–10.2)
CHLORIDE SERPL-SCNC: 103 MMOL/L (ref 96–106)
CHOLEST SERPL-MCNC: 95 MG/DL (ref 100–199)
CO2 SERPL-SCNC: 21 MMOL/L (ref 20–29)
CREAT SERPL-MCNC: 1.4 MG/DL (ref 0.76–1.27)
EGFRCR SERPLBLD CKD-EPI 2021: 50 ML/MIN/1.73
ERYTHROCYTE [DISTWIDTH] IN BLOOD BY AUTOMATED COUNT: 13.6 % (ref 11.6–15.4)
GLOBULIN SER CALC-MCNC: 2.5 G/DL (ref 1.5–4.5)
GLUCOSE SERPL-MCNC: 94 MG/DL (ref 70–99)
HCT VFR BLD AUTO: 40.8 % (ref 37.5–51)
HDLC SERPL-MCNC: 47 MG/DL
HGB BLD-MCNC: 13.4 G/DL (ref 13–17.7)
LDLC SERPL CALC-MCNC: 35 MG/DL (ref 0–99)
MCH RBC QN AUTO: 31.2 PG (ref 26.6–33)
MCHC RBC AUTO-ENTMCNC: 32.8 G/DL (ref 31.5–35.7)
MCV RBC AUTO: 95 FL (ref 79–97)
MORPHOLOGY BLD-IMP: NORMAL
PLATELET # BLD AUTO: 46 X10E3/UL (ref 150–450)
POTASSIUM SERPL-SCNC: 5.1 MMOL/L (ref 3.5–5.2)
PROT SERPL-MCNC: 7.2 G/DL (ref 6–8.5)
RBC # BLD AUTO: 4.29 X10E6/UL (ref 4.14–5.8)
SODIUM SERPL-SCNC: 140 MMOL/L (ref 134–144)
T4 FREE SERPL-MCNC: 0.89 NG/DL (ref 0.82–1.77)
TRIGL SERPL-MCNC: 51 MG/DL (ref 0–149)
VLDLC SERPL CALC-MCNC: 13 MG/DL (ref 5–40)
WBC # BLD AUTO: 4.6 X10E3/UL (ref 3.4–10.8)

## 2023-06-09 NOTE — RESULT ENCOUNTER NOTE
Pt made aware of lab results. Encounter note and labs faxed to hematology. Pt made aware to be seen at the ER if he has any abnormal bleeding.

## 2023-06-28 ENCOUNTER — CLINICAL DOCUMENTATION (OUTPATIENT)
Dept: CASE MANAGEMENT | Age: 83
End: 2023-06-28

## 2023-06-30 ENCOUNTER — OFFICE VISIT (OUTPATIENT)
Facility: CLINIC | Age: 83
End: 2023-06-30

## 2023-06-30 VITALS
WEIGHT: 158.8 LBS | RESPIRATION RATE: 18 BRPM | SYSTOLIC BLOOD PRESSURE: 151 MMHG | OXYGEN SATURATION: 94 % | HEART RATE: 50 BPM | TEMPERATURE: 97.6 F | HEIGHT: 72 IN | BODY MASS INDEX: 21.51 KG/M2 | DIASTOLIC BLOOD PRESSURE: 64 MMHG

## 2023-06-30 DIAGNOSIS — I10 ESSENTIAL HYPERTENSION: ICD-10-CM

## 2023-06-30 DIAGNOSIS — I25.10 CORONARY ARTERIOSCLEROSIS: ICD-10-CM

## 2023-06-30 DIAGNOSIS — Z95.5 HISTORY OF CORONARY ARTERY STENT PLACEMENT: ICD-10-CM

## 2023-06-30 DIAGNOSIS — D69.6 THROMBOCYTOPENIA (HCC): ICD-10-CM

## 2023-06-30 DIAGNOSIS — I71.43 INFRARENAL ABDOMINAL AORTIC ANEURYSM (AAA) WITHOUT RUPTURE (HCC): ICD-10-CM

## 2023-06-30 DIAGNOSIS — E03.9 HYPOTHYROIDISM, UNSPECIFIED TYPE: ICD-10-CM

## 2023-06-30 DIAGNOSIS — Z00.00 MEDICARE ANNUAL WELLNESS VISIT, SUBSEQUENT: Primary | ICD-10-CM

## 2023-06-30 DIAGNOSIS — J30.9 ALLERGIC RHINITIS, UNSPECIFIED SEASONALITY, UNSPECIFIED TRIGGER: ICD-10-CM

## 2023-06-30 DIAGNOSIS — R41.3 MEMORY IMPAIRMENT: ICD-10-CM

## 2023-06-30 RX ORDER — FLUTICASONE PROPIONATE 50 MCG
1 SPRAY, SUSPENSION (ML) NASAL DAILY PRN
Qty: 32 G | Refills: 1 | Status: SHIPPED | OUTPATIENT
Start: 2023-06-30

## 2023-06-30 RX ORDER — FLUTICASONE PROPIONATE 50 MCG
1 SPRAY, SUSPENSION (ML) NASAL DAILY PRN
Qty: 32 G | Refills: 1 | Status: SHIPPED | OUTPATIENT
Start: 2023-06-30 | End: 2023-06-30

## 2023-06-30 RX ORDER — AMLODIPINE BESYLATE 10 MG/1
10 TABLET ORAL DAILY
Qty: 30 TABLET | Refills: 2 | Status: SHIPPED | OUTPATIENT
Start: 2023-06-30 | End: 2023-06-30

## 2023-06-30 RX ORDER — AMLODIPINE BESYLATE 5 MG/1
5 TABLET ORAL DAILY
COMMUNITY

## 2023-06-30 RX ORDER — MEMANTINE HYDROCHLORIDE 10 MG/1
10 TABLET ORAL 2 TIMES DAILY
Qty: 60 TABLET | Refills: 1 | Status: SHIPPED | OUTPATIENT
Start: 2023-06-30

## 2023-06-30 SDOH — ECONOMIC STABILITY: INCOME INSECURITY: HOW HARD IS IT FOR YOU TO PAY FOR THE VERY BASICS LIKE FOOD, HOUSING, MEDICAL CARE, AND HEATING?: NOT HARD AT ALL

## 2023-06-30 SDOH — ECONOMIC STABILITY: FOOD INSECURITY: WITHIN THE PAST 12 MONTHS, THE FOOD YOU BOUGHT JUST DIDN'T LAST AND YOU DIDN'T HAVE MONEY TO GET MORE.: NEVER TRUE

## 2023-06-30 SDOH — ECONOMIC STABILITY: FOOD INSECURITY: WITHIN THE PAST 12 MONTHS, YOU WORRIED THAT YOUR FOOD WOULD RUN OUT BEFORE YOU GOT MONEY TO BUY MORE.: NEVER TRUE

## 2023-06-30 ASSESSMENT — ANXIETY QUESTIONNAIRES
2. NOT BEING ABLE TO STOP OR CONTROL WORRYING: 0
5. BEING SO RESTLESS THAT IT IS HARD TO SIT STILL: 0
1. FEELING NERVOUS, ANXIOUS, OR ON EDGE: 0
3. WORRYING TOO MUCH ABOUT DIFFERENT THINGS: 0
GAD7 TOTAL SCORE: 0
IF YOU CHECKED OFF ANY PROBLEMS ON THIS QUESTIONNAIRE, HOW DIFFICULT HAVE THESE PROBLEMS MADE IT FOR YOU TO DO YOUR WORK, TAKE CARE OF THINGS AT HOME, OR GET ALONG WITH OTHER PEOPLE: NOT DIFFICULT AT ALL
4. TROUBLE RELAXING: 0
6. BECOMING EASILY ANNOYED OR IRRITABLE: 0
7. FEELING AFRAID AS IF SOMETHING AWFUL MIGHT HAPPEN: 0

## 2023-06-30 ASSESSMENT — LIFESTYLE VARIABLES
HOW OFTEN DO YOU HAVE A DRINK CONTAINING ALCOHOL: NEVER
HOW MANY STANDARD DRINKS CONTAINING ALCOHOL DO YOU HAVE ON A TYPICAL DAY: PATIENT DOES NOT DRINK

## 2023-06-30 ASSESSMENT — PATIENT HEALTH QUESTIONNAIRE - PHQ9
SUM OF ALL RESPONSES TO PHQ QUESTIONS 1-9: 0
9. THOUGHTS THAT YOU WOULD BE BETTER OFF DEAD, OR OF HURTING YOURSELF: 0
10. IF YOU CHECKED OFF ANY PROBLEMS, HOW DIFFICULT HAVE THESE PROBLEMS MADE IT FOR YOU TO DO YOUR WORK, TAKE CARE OF THINGS AT HOME, OR GET ALONG WITH OTHER PEOPLE: 0
SUM OF ALL RESPONSES TO PHQ QUESTIONS 1-9: 0
8. MOVING OR SPEAKING SO SLOWLY THAT OTHER PEOPLE COULD HAVE NOTICED. OR THE OPPOSITE, BEING SO FIGETY OR RESTLESS THAT YOU HAVE BEEN MOVING AROUND A LOT MORE THAN USUAL: 0
SUM OF ALL RESPONSES TO PHQ QUESTIONS 1-9: 0
4. FEELING TIRED OR HAVING LITTLE ENERGY: 0
2. FEELING DOWN, DEPRESSED OR HOPELESS: 0
1. LITTLE INTEREST OR PLEASURE IN DOING THINGS: 0
SUM OF ALL RESPONSES TO PHQ9 QUESTIONS 1 & 2: 0
5. POOR APPETITE OR OVEREATING: 0
6. FEELING BAD ABOUT YOURSELF - OR THAT YOU ARE A FAILURE OR HAVE LET YOURSELF OR YOUR FAMILY DOWN: 0
7. TROUBLE CONCENTRATING ON THINGS, SUCH AS READING THE NEWSPAPER OR WATCHING TELEVISION: 0
3. TROUBLE FALLING OR STAYING ASLEEP: 0
SUM OF ALL RESPONSES TO PHQ QUESTIONS 1-9: 0

## 2024-07-11 ENCOUNTER — HOSPITAL ENCOUNTER (OUTPATIENT)
Age: 84
Discharge: HOME | End: 2024-07-11
Payer: MEDICARE

## 2024-07-11 ENCOUNTER — HOSPITAL ENCOUNTER (OUTPATIENT)
Age: 84
Discharge: HOME | End: 2024-07-14

## 2024-07-11 DIAGNOSIS — R33.9 URINARY RETENTION: ICD-10-CM

## 2024-07-11 LAB
EKG ATRIAL RATE: 52 BPM
EKG DIAGNOSIS: NORMAL
EKG P AXIS: 25 DEGREES
EKG P-R INTERVAL: 132 MS
EKG Q-T INTERVAL: 458 MS
EKG QRS DURATION: 112 MS
EKG QTC CALCULATION (BAZETT): 425 MS
EKG R AXIS: -67 DEGREES
EKG T AXIS: 25 DEGREES
EKG VENTRICULAR RATE: 52 BPM
LABCORP DRAW FEE: NORMAL

## 2024-07-11 PROCEDURE — 93005 ELECTROCARDIOGRAM TRACING: CPT

## 2024-07-11 PROCEDURE — 99001 SPECIMEN HANDLING PT-LAB: CPT

## 2024-07-19 ENCOUNTER — ANESTHESIA EVENT (OUTPATIENT)
Facility: HOSPITAL | Age: 84
End: 2024-07-19
Payer: MEDICARE

## 2024-07-22 ENCOUNTER — HOSPITAL ENCOUNTER (OUTPATIENT)
Facility: HOSPITAL | Age: 84
Discharge: HOME | End: 2024-07-22
Attending: UROLOGY | Admitting: UROLOGY
Payer: MEDICARE

## 2024-07-22 ENCOUNTER — ANESTHESIA (OUTPATIENT)
Facility: HOSPITAL | Age: 84
End: 2024-07-22
Payer: MEDICARE

## 2024-07-22 VITALS
RESPIRATION RATE: 17 BRPM | TEMPERATURE: 97.6 F | DIASTOLIC BLOOD PRESSURE: 69 MMHG | HEIGHT: 72 IN | WEIGHT: 161 LBS | BODY MASS INDEX: 21.81 KG/M2 | OXYGEN SATURATION: 96 % | SYSTOLIC BLOOD PRESSURE: 153 MMHG | HEART RATE: 57 BPM

## 2024-07-22 DIAGNOSIS — N13.8 ENLARGED PROSTATE WITH URINARY OBSTRUCTION: Primary | ICD-10-CM

## 2024-07-22 DIAGNOSIS — I10 ESSENTIAL HYPERTENSION: ICD-10-CM

## 2024-07-22 DIAGNOSIS — N40.1 ENLARGED PROSTATE WITH URINARY OBSTRUCTION: Primary | ICD-10-CM

## 2024-07-22 LAB
ABO + RH BLD: NORMAL
BLOOD GROUP ANTIBODIES SERPL: NORMAL
ERYTHROCYTE [DISTWIDTH] IN BLOOD BY AUTOMATED COUNT: 15 % (ref 11.6–14.5)
HCT VFR BLD AUTO: 39.8 % (ref 36–48)
HGB BLD-MCNC: 12.8 G/DL (ref 13–16)
MCH RBC QN AUTO: 30.5 PG (ref 24–34)
MCHC RBC AUTO-ENTMCNC: 32.2 G/DL (ref 31–37)
MCV RBC AUTO: 94.8 FL (ref 78–100)
NRBC # BLD: 0 K/UL (ref 0–0.01)
NRBC BLD-RTO: 0 PER 100 WBC
PLATELET # BLD AUTO: 54 K/UL (ref 135–420)
PMV BLD AUTO: 11.7 FL (ref 9.2–11.8)
RBC # BLD AUTO: 4.2 M/UL (ref 4.35–5.65)
SPECIMEN EXP DATE BLD: NORMAL
WBC # BLD AUTO: 4.3 K/UL (ref 4.6–13.2)

## 2024-07-22 PROCEDURE — 2580000003 HC RX 258: Performed by: UROLOGY

## 2024-07-22 PROCEDURE — 2500000003 HC RX 250 WO HCPCS: Performed by: NURSE ANESTHETIST, CERTIFIED REGISTERED

## 2024-07-22 PROCEDURE — C1769 GUIDE WIRE: HCPCS | Performed by: UROLOGY

## 2024-07-22 PROCEDURE — 3700000000 HC ANESTHESIA ATTENDED CARE: Performed by: UROLOGY

## 2024-07-22 PROCEDURE — C1782 MORCELLATOR: HCPCS | Performed by: UROLOGY

## 2024-07-22 PROCEDURE — 7100000000 HC PACU RECOVERY - FIRST 15 MIN: Performed by: UROLOGY

## 2024-07-22 PROCEDURE — 3700000001 HC ADD 15 MINUTES (ANESTHESIA): Performed by: UROLOGY

## 2024-07-22 PROCEDURE — 2580000003 HC RX 258: Performed by: NURSE ANESTHETIST, CERTIFIED REGISTERED

## 2024-07-22 PROCEDURE — 6370000000 HC RX 637 (ALT 250 FOR IP): Performed by: NURSE ANESTHETIST, CERTIFIED REGISTERED

## 2024-07-22 PROCEDURE — 86900 BLOOD TYPING SEROLOGIC ABO: CPT

## 2024-07-22 PROCEDURE — 85027 COMPLETE CBC AUTOMATED: CPT

## 2024-07-22 PROCEDURE — 88305 TISSUE EXAM BY PATHOLOGIST: CPT

## 2024-07-22 PROCEDURE — 3600000003 HC SURGERY LEVEL 3 BASE: Performed by: UROLOGY

## 2024-07-22 PROCEDURE — C1758 CATHETER, URETERAL: HCPCS | Performed by: UROLOGY

## 2024-07-22 PROCEDURE — 7100000001 HC PACU RECOVERY - ADDTL 15 MIN: Performed by: UROLOGY

## 2024-07-22 PROCEDURE — 6370000000 HC RX 637 (ALT 250 FOR IP): Performed by: UROLOGY

## 2024-07-22 PROCEDURE — 7100000010 HC PHASE II RECOVERY - FIRST 15 MIN: Performed by: UROLOGY

## 2024-07-22 PROCEDURE — 86850 RBC ANTIBODY SCREEN: CPT

## 2024-07-22 PROCEDURE — 6360000002 HC RX W HCPCS: Performed by: UROLOGY

## 2024-07-22 PROCEDURE — A4217 STERILE WATER/SALINE, 500 ML: HCPCS | Performed by: UROLOGY

## 2024-07-22 PROCEDURE — 6360000002 HC RX W HCPCS: Performed by: NURSE ANESTHETIST, CERTIFIED REGISTERED

## 2024-07-22 PROCEDURE — 2709999900 HC NON-CHARGEABLE SUPPLY: Performed by: UROLOGY

## 2024-07-22 PROCEDURE — 3600000013 HC SURGERY LEVEL 3 ADDTL 15MIN: Performed by: UROLOGY

## 2024-07-22 PROCEDURE — 86901 BLOOD TYPING SEROLOGIC RH(D): CPT

## 2024-07-22 PROCEDURE — 7100000011 HC PHASE II RECOVERY - ADDTL 15 MIN: Performed by: UROLOGY

## 2024-07-22 RX ORDER — EPHEDRINE SULFATE/0.9% NACL/PF 25 MG/5 ML
SYRINGE (ML) INTRAVENOUS PRN
Status: DISCONTINUED | OUTPATIENT
Start: 2024-07-22 | End: 2024-07-22 | Stop reason: SDUPTHER

## 2024-07-22 RX ORDER — GLUCAGON 1 MG
1 KIT INJECTION PRN
Status: DISCONTINUED | OUTPATIENT
Start: 2024-07-22 | End: 2024-07-22 | Stop reason: HOSPADM

## 2024-07-22 RX ORDER — ACETAMINOPHEN 325 MG/1
650 TABLET ORAL EVERY 6 HOURS
Status: CANCELLED | OUTPATIENT
Start: 2024-07-22

## 2024-07-22 RX ORDER — ROCURONIUM BROMIDE 10 MG/ML
INJECTION, SOLUTION INTRAVENOUS PRN
Status: DISCONTINUED | OUTPATIENT
Start: 2024-07-22 | End: 2024-07-22 | Stop reason: SDUPTHER

## 2024-07-22 RX ORDER — CEFDINIR 300 MG/1
300 CAPSULE ORAL
Qty: 5 CAPSULE | Refills: 0 | Status: SHIPPED | OUTPATIENT
Start: 2024-07-22 | End: 2024-07-27

## 2024-07-22 RX ORDER — OXYCODONE HYDROCHLORIDE 5 MG/1
5 TABLET ORAL EVERY 4 HOURS PRN
Status: CANCELLED | OUTPATIENT
Start: 2024-07-22

## 2024-07-22 RX ORDER — LIDOCAINE HYDROCHLORIDE 20 MG/ML
INJECTION, SOLUTION EPIDURAL; INFILTRATION; INTRACAUDAL; PERINEURAL PRN
Status: DISCONTINUED | OUTPATIENT
Start: 2024-07-22 | End: 2024-07-22 | Stop reason: SDUPTHER

## 2024-07-22 RX ORDER — LEVOFLOXACIN 5 MG/ML
500 INJECTION, SOLUTION INTRAVENOUS
Status: COMPLETED | OUTPATIENT
Start: 2024-07-22 | End: 2024-07-22

## 2024-07-22 RX ORDER — SODIUM CHLORIDE 0.9 % (FLUSH) 0.9 %
5-40 SYRINGE (ML) INJECTION PRN
Status: CANCELLED | OUTPATIENT
Start: 2024-07-22

## 2024-07-22 RX ORDER — SODIUM CHLORIDE 0.9 % (FLUSH) 0.9 %
5-40 SYRINGE (ML) INJECTION EVERY 12 HOURS SCHEDULED
Status: DISCONTINUED | OUTPATIENT
Start: 2024-07-22 | End: 2024-07-22 | Stop reason: HOSPADM

## 2024-07-22 RX ORDER — BACITRACIN ZINC 500 [USP'U]/G
OINTMENT TOPICAL 2 TIMES DAILY
Status: CANCELLED | OUTPATIENT
Start: 2024-07-22

## 2024-07-22 RX ORDER — PROPOFOL 10 MG/ML
INJECTION, EMULSION INTRAVENOUS PRN
Status: DISCONTINUED | OUTPATIENT
Start: 2024-07-22 | End: 2024-07-22 | Stop reason: SDUPTHER

## 2024-07-22 RX ORDER — TRAMADOL HYDROCHLORIDE 50 MG/1
50 TABLET ORAL EVERY 6 HOURS PRN
Qty: 28 TABLET | Refills: 0 | Status: SHIPPED | OUTPATIENT
Start: 2024-07-22 | End: 2024-07-29

## 2024-07-22 RX ORDER — FAMOTIDINE 20 MG/1
20 TABLET, FILM COATED ORAL ONCE
Status: COMPLETED | OUTPATIENT
Start: 2024-07-22 | End: 2024-07-22

## 2024-07-22 RX ORDER — HYDROMORPHONE HYDROCHLORIDE 2 MG/ML
0.25 INJECTION, SOLUTION INTRAMUSCULAR; INTRAVENOUS; SUBCUTANEOUS
Status: CANCELLED | OUTPATIENT
Start: 2024-07-22

## 2024-07-22 RX ORDER — ULTRASOUND COUPLING MEDIUM
GEL (GRAM) TOPICAL PRN
Status: DISCONTINUED | OUTPATIENT
Start: 2024-07-22 | End: 2024-07-22 | Stop reason: ALTCHOICE

## 2024-07-22 RX ORDER — SODIUM CHLORIDE 0.9 % (FLUSH) 0.9 %
5-40 SYRINGE (ML) INJECTION PRN
Status: DISCONTINUED | OUTPATIENT
Start: 2024-07-22 | End: 2024-07-22 | Stop reason: HOSPADM

## 2024-07-22 RX ORDER — DEXAMETHASONE SODIUM PHOSPHATE 4 MG/ML
INJECTION, SOLUTION INTRA-ARTICULAR; INTRALESIONAL; INTRAMUSCULAR; INTRAVENOUS; SOFT TISSUE PRN
Status: DISCONTINUED | OUTPATIENT
Start: 2024-07-22 | End: 2024-07-22 | Stop reason: SDUPTHER

## 2024-07-22 RX ORDER — CEFDINIR 300 MG/1
300 CAPSULE ORAL
Qty: 5 CAPSULE | Refills: 0 | Status: SHIPPED | OUTPATIENT
Start: 2024-07-22 | End: 2024-07-22

## 2024-07-22 RX ORDER — ONDANSETRON 4 MG/1
4 TABLET, ORALLY DISINTEGRATING ORAL EVERY 8 HOURS PRN
Status: CANCELLED | OUTPATIENT
Start: 2024-07-22

## 2024-07-22 RX ORDER — TRAMADOL HYDROCHLORIDE 50 MG/1
50 TABLET ORAL EVERY 6 HOURS PRN
Qty: 28 TABLET | Refills: 0 | Status: SHIPPED | OUTPATIENT
Start: 2024-07-22 | End: 2024-07-22

## 2024-07-22 RX ORDER — SODIUM CHLORIDE 0.9 % (FLUSH) 0.9 %
5-40 SYRINGE (ML) INJECTION EVERY 12 HOURS SCHEDULED
Status: CANCELLED | OUTPATIENT
Start: 2024-07-22

## 2024-07-22 RX ORDER — FUROSEMIDE 10 MG/ML
20 INJECTION INTRAMUSCULAR; INTRAVENOUS ONCE
Status: COMPLETED | OUTPATIENT
Start: 2024-07-22 | End: 2024-07-22

## 2024-07-22 RX ORDER — HYDROMORPHONE HYDROCHLORIDE 2 MG/ML
0.5 INJECTION, SOLUTION INTRAMUSCULAR; INTRAVENOUS; SUBCUTANEOUS
Status: CANCELLED | OUTPATIENT
Start: 2024-07-22

## 2024-07-22 RX ORDER — FENTANYL CITRATE 50 UG/ML
INJECTION, SOLUTION INTRAMUSCULAR; INTRAVENOUS PRN
Status: DISCONTINUED | OUTPATIENT
Start: 2024-07-22 | End: 2024-07-22 | Stop reason: SDUPTHER

## 2024-07-22 RX ORDER — SODIUM CHLORIDE 9 MG/ML
INJECTION, SOLUTION INTRAVENOUS PRN
Status: CANCELLED | OUTPATIENT
Start: 2024-07-22

## 2024-07-22 RX ORDER — SODIUM CHLORIDE 9 MG/ML
INJECTION, SOLUTION INTRAVENOUS PRN
Status: DISCONTINUED | OUTPATIENT
Start: 2024-07-22 | End: 2024-07-22 | Stop reason: HOSPADM

## 2024-07-22 RX ORDER — SUCCINYLCHOLINE/SOD CL,ISO/PF 100 MG/5ML
SYRINGE (ML) INTRAVENOUS PRN
Status: DISCONTINUED | OUTPATIENT
Start: 2024-07-22 | End: 2024-07-22 | Stop reason: SDUPTHER

## 2024-07-22 RX ORDER — DEXTROSE MONOHYDRATE 100 MG/ML
INJECTION, SOLUTION INTRAVENOUS CONTINUOUS PRN
Status: DISCONTINUED | OUTPATIENT
Start: 2024-07-22 | End: 2024-07-22 | Stop reason: HOSPADM

## 2024-07-22 RX ORDER — ONDANSETRON 2 MG/ML
4 INJECTION INTRAMUSCULAR; INTRAVENOUS EVERY 6 HOURS PRN
Status: CANCELLED | OUTPATIENT
Start: 2024-07-22

## 2024-07-22 RX ORDER — ONDANSETRON 2 MG/ML
INJECTION INTRAMUSCULAR; INTRAVENOUS PRN
Status: DISCONTINUED | OUTPATIENT
Start: 2024-07-22 | End: 2024-07-22 | Stop reason: SDUPTHER

## 2024-07-22 RX ORDER — NALOXONE HYDROCHLORIDE 0.4 MG/ML
INJECTION, SOLUTION INTRAMUSCULAR; INTRAVENOUS; SUBCUTANEOUS PRN
Status: DISCONTINUED | OUTPATIENT
Start: 2024-07-22 | End: 2024-07-22 | Stop reason: HOSPADM

## 2024-07-22 RX ORDER — FENTANYL CITRATE 50 UG/ML
25 INJECTION, SOLUTION INTRAMUSCULAR; INTRAVENOUS EVERY 5 MIN PRN
Status: DISCONTINUED | OUTPATIENT
Start: 2024-07-22 | End: 2024-07-22 | Stop reason: HOSPADM

## 2024-07-22 RX ORDER — MIDAZOLAM HYDROCHLORIDE 1 MG/ML
INJECTION INTRAMUSCULAR; INTRAVENOUS PRN
Status: DISCONTINUED | OUTPATIENT
Start: 2024-07-22 | End: 2024-07-22 | Stop reason: SDUPTHER

## 2024-07-22 RX ORDER — SODIUM CHLORIDE, SODIUM LACTATE, POTASSIUM CHLORIDE, CALCIUM CHLORIDE 600; 310; 30; 20 MG/100ML; MG/100ML; MG/100ML; MG/100ML
INJECTION, SOLUTION INTRAVENOUS CONTINUOUS
Status: CANCELLED | OUTPATIENT
Start: 2024-07-22

## 2024-07-22 RX ORDER — SODIUM CHLORIDE, SODIUM LACTATE, POTASSIUM CHLORIDE, CALCIUM CHLORIDE 600; 310; 30; 20 MG/100ML; MG/100ML; MG/100ML; MG/100ML
INJECTION, SOLUTION INTRAVENOUS CONTINUOUS
Status: DISCONTINUED | OUTPATIENT
Start: 2024-07-22 | End: 2024-07-22 | Stop reason: HOSPADM

## 2024-07-22 RX ORDER — ONDANSETRON 2 MG/ML
4 INJECTION INTRAMUSCULAR; INTRAVENOUS
Status: DISCONTINUED | OUTPATIENT
Start: 2024-07-22 | End: 2024-07-22 | Stop reason: HOSPADM

## 2024-07-22 RX ADMIN — SODIUM CHLORIDE, SODIUM LACTATE, POTASSIUM CHLORIDE, AND CALCIUM CHLORIDE: 600; 310; 30; 20 INJECTION, SOLUTION INTRAVENOUS at 08:10

## 2024-07-22 RX ADMIN — PROPOFOL 150 MG: 10 INJECTION, EMULSION INTRAVENOUS at 09:48

## 2024-07-22 RX ADMIN — EPHEDRINE SULFATE 5 MG: 5 INJECTION INTRAVENOUS at 10:23

## 2024-07-22 RX ADMIN — LEVOFLOXACIN 500 MG: 5 INJECTION, SOLUTION INTRAVENOUS at 09:39

## 2024-07-22 RX ADMIN — DEXAMETHASONE SODIUM PHOSPHATE 4 MG: 4 INJECTION INTRA-ARTICULAR; INTRALESIONAL; INTRAMUSCULAR; INTRAVENOUS; SOFT TISSUE at 09:50

## 2024-07-22 RX ADMIN — ROCURONIUM BROMIDE 10 MG: 10 INJECTION, SOLUTION INTRAVENOUS at 10:15

## 2024-07-22 RX ADMIN — TRANEXAMIC ACID 1 G: 100 INJECTION, SOLUTION INTRAVENOUS at 09:51

## 2024-07-22 RX ADMIN — FAMOTIDINE 20 MG: 20 TABLET ORAL at 08:59

## 2024-07-22 RX ADMIN — SUGAMMADEX 200 MG: 100 INJECTION, SOLUTION INTRAVENOUS at 11:10

## 2024-07-22 RX ADMIN — CEFTRIAXONE 1000 MG: 1 INJECTION, POWDER, FOR SOLUTION INTRAMUSCULAR; INTRAVENOUS at 09:50

## 2024-07-22 RX ADMIN — MIDAZOLAM 1 MG: 1 INJECTION, SOLUTION INTRAMUSCULAR; INTRAVENOUS at 09:39

## 2024-07-22 RX ADMIN — FENTANYL CITRATE 50 MCG: 50 INJECTION INTRAMUSCULAR; INTRAVENOUS at 09:48

## 2024-07-22 RX ADMIN — TRANEXAMIC ACID 1 G: 100 INJECTION, SOLUTION INTRAVENOUS at 10:47

## 2024-07-22 RX ADMIN — EPHEDRINE SULFATE 5 MG: 5 INJECTION INTRAVENOUS at 10:18

## 2024-07-22 RX ADMIN — ROCURONIUM BROMIDE 5 MG: 10 INJECTION, SOLUTION INTRAVENOUS at 09:48

## 2024-07-22 RX ADMIN — ONDANSETRON 4 MG: 2 INJECTION INTRAMUSCULAR; INTRAVENOUS at 11:07

## 2024-07-22 RX ADMIN — FUROSEMIDE 20 MG: 10 INJECTION, SOLUTION INTRAMUSCULAR; INTRAVENOUS at 13:34

## 2024-07-22 RX ADMIN — Medication 100 MG: at 09:48

## 2024-07-22 RX ADMIN — SODIUM CHLORIDE, SODIUM LACTATE, POTASSIUM CHLORIDE, AND CALCIUM CHLORIDE: 600; 310; 30; 20 INJECTION, SOLUTION INTRAVENOUS at 10:42

## 2024-07-22 RX ADMIN — ROCURONIUM BROMIDE 10 MG: 10 INJECTION, SOLUTION INTRAVENOUS at 10:40

## 2024-07-22 RX ADMIN — FENTANYL CITRATE 50 MCG: 50 INJECTION INTRAMUSCULAR; INTRAVENOUS at 10:05

## 2024-07-22 RX ADMIN — LIDOCAINE HYDROCHLORIDE 80 MG: 20 INJECTION, SOLUTION EPIDURAL; INFILTRATION; INTRACAUDAL; PERINEURAL at 09:48

## 2024-07-22 RX ADMIN — ROCURONIUM BROMIDE 35 MG: 10 INJECTION, SOLUTION INTRAVENOUS at 09:58

## 2024-07-22 NOTE — DISCHARGE INSTRUCTIONS
dysuria, is common after this procedure.  This may occur at any time of the urinary stream.  This will continue to improve over time.      - It is common to temporary urinary leakage after this procedure.  This will continue to improve over time.  You may additionally perform Kegel exercises to help build the muscles at the base of the bladder.      FOLLOW UP CARE:  You have an appointment tomorrow in the clinic for catheter removal.      Following this you will have an appointment in 3 months with Dr. Cool.  Prior to the appointment you will have a PSA, urinalysis, uroflow with PVR followed by an office visit.

## 2024-07-22 NOTE — H&P
ESOPHAGOGASTRODUODENOSCOPY      HERNIA REPAIR      INGUINAL HERNIA REPAIR Bilateral     OTHER SURGICAL HISTORY  90s    b/l renal stents    UROLOGICAL SURGERY      cystoscopy with bilateral stent placement       Social History     Tobacco Use    Smoking status: Former     Current packs/day: 0.00     Average packs/day: 3.0 packs/day for 50.0 years (150.0 ttl pk-yrs)     Types: Cigarettes     Start date: 1957     Quit date: 2007     Years since quittin.8    Smokeless tobacco: Never   Vaping Use    Vaping Use: Never used   Substance Use Topics    Alcohol use: No    Drug use: Never       No Known Allergies    Family History   Problem Relation Age of Onset    Cancer Mother     Hypertension Father     Heart Attack Father     Stroke Neg Hx        Current Facility-Administered Medications   Medication Dose Route Frequency Provider Last Rate Last Admin    lactated ringers IV soln infusion   IntraVENous Continuous Moinca Rock APRN - CRNA 75 mL/hr at 24 0810 New Bag at 24 0810    sodium chloride flush 0.9 % injection 5-40 mL  5-40 mL IntraVENous 2 times per day Monica Rock APRN - CRNA        sodium chloride flush 0.9 % injection 5-40 mL  5-40 mL IntraVENous PRN Monica Rock APRN - CRNA        0.9 % sodium chloride infusion   IntraVENous PRN Monica Rock APRN - CRNA        cefTRIAXone (ROCEPHIN) 1,000 mg in sterile water 10 mL IV syringe  1,000 mg IntraVENous On Call to OR Shyam Cool MD        levoFLOXacin (LEVAQUIN) 500 MG/100ML infusion 500 mg  500 mg IntraVENous On Call to OR Shyam Cool MD           Review of Systems  Constitutional: No fever, chills, or weight loss  Respiratory: No dyspnea  Cardiovascular: No chest pain  Gastrointestinal: No vomiting or abdominal pain.  Genitourinary: Denies frequency, urgency, dysuria, hematuria.    Neurological: No focal motor changes.     PHYSICAL EXAMINATION:   Ht 1.829 m (6')   Wt 73.5 kg (162 lb)   BMI 21.97 kg/m²

## 2024-07-22 NOTE — ANESTHESIA PRE PROCEDURE
Department of Anesthesiology  Preprocedure Note       Name:  Jaret Benites   Age:  84 y.o.  :  1940                                          MRN:  [id number]         Date:  2024      Surgeon: Surgeon(s):  Shyam Cool MD    Procedure: Procedure(s):  HOLMIUM LASER ENUCLEATION OF PROSTATE LASER - HOLEP  *GENERAL W/PARALYSIS*    Medications prior to admission:   Prior to Admission medications    Medication Sig Start Date End Date Taking? Authorizing Provider   amLODIPine (NORVASC) 10 MG tablet GIVE 1 TABLET BY MOUTH ONCE TIME A DAY FOR HYPERTENSION 23   Provider, MD Baljinder   LAGEVRIO 200 MG capsule TAKE FOUR CAPSULES BY MOUTH TWICE DAILY FOR 5 DAYS 10/14/23   ProviderBaljinder MD   finasteride (PROSCAR) 5 MG tablet Take 1 tablet by mouth daily  Patient not taking: Reported on 7/15/2024 1/2/24   Nalini Hernandez PA   tamsulosin (FLOMAX) 0.4 MG capsule Take 2 capsules by mouth daily  Patient not taking: Reported on 7/15/2024 12/15/23   Nalini Hernandez PA   famotidine (PEPCID) 20 MG tablet GIVE 1 TABLET BY MOUTH EVERY 12 HOURS FOR GERD 23   ProviderBaljinder MD   levothyroxine (SYNTHROID) 50 MCG tablet Take 1 tablet by mouth daily  Patient not taking: Reported on 7/15/2024 6/5/23   Kris Olivier MD   carvedilol (COREG) 6.25 MG tablet Take 1 tablet by mouth 2 times daily    ProviderBaljinder MD   losartan (COZAAR) 100 MG tablet Take 1 tablet by mouth daily  Patient taking differently: Take 0.5 tablets by mouth daily 23   Kris Olivier MD       Current medications:    Current Facility-Administered Medications   Medication Dose Route Frequency Provider Last Rate Last Admin   • famotidine (PEPCID) tablet 20 mg  20 mg Oral Once Monica Rock APRN - CRNA       • lactated ringers IV soln infusion   IntraVENous Continuous Monica Rock APRN - CRNA       • sodium chloride flush 0.9 % injection 5-40 mL  5-40 mL IntraVENous 2 times per day Monica Rock APRN -

## 2024-07-22 NOTE — ANESTHESIA POSTPROCEDURE EVALUATION
Department of Anesthesiology  Postprocedure Note    Patient: Jaret Benites  MRN: [id number]  YOB: 1940  Date of evaluation: 7/22/2024    Procedure Summary       Date: 07/22/24 Room / Location: [facility] MAIN 08 / [facility] MAIN OR    Anesthesia Start: 0939 Anesthesia Stop: 1128    Procedure: HOLMIUM LASER ENUCLEATION OF PROSTATE LASER - HOLEP  *GENERAL W/PARALYSIS* Diagnosis:       Benign prostatic hyperplasia, presence of lower urinary tract symptoms unspecified      (Benign prostatic hyperplasia, presence of lower urinary tract symptoms unspecified [N40.0])    Surgeons: Shyam Cool MD Responsible Provider: Rachel Benson MD    Anesthesia Type: General ASA Status: 3            Anesthesia Type: General    Stan Phase I: Stan Score: 10    Stan Phase II: Stan Score: 10    Anesthesia Post Evaluation    Patient location during evaluation: PACU  Patient participation: complete - patient participated  Level of consciousness: awake and alert  Pain score: 0  Airway patency: patent  Nausea & Vomiting: no nausea and no vomiting  Cardiovascular status: hemodynamically stable  Respiratory status: acceptable  Hydration status: euvolemic  Multimodal analgesia pain management approach  Pain management: adequate    No notable events documented.

## 2024-07-22 NOTE — PERIOP NOTE
Pt's Platelet count 54, Dr. Cool aware, Surgery will proceed. Dr. Cool at bedside Speaking w/ pt. R/T pt's denial of taking any blood thinning medication

## 2024-07-22 NOTE — OP NOTE
LOCATION: [facility]     OPERATIVE NOTE  7/22/2024, 11:20 AM      Pre Op Diagnosis:   1. Bladder Outlet Obstruction / Benign Prostatic Hypertrophy  2. Bladder stone     Post Op Diagnosis:   1. Same     Procedure:   Holmium Laser Enucleation of the prostate   Laser cystolithopaxy, 1cm bladder stone     Findings:  Enucleation time: 43 min   Morcellation time: 2 min   Total Tissue retrieved: 62gm     Surgeon: Shyam Cool MD  RESIDENT: Dr. Calvin     Other OR Staff/Assistants:  Circulator: Santi Saez RN; Rebekah Harris RN  Relief Scrub: Amy Navarro  Scrub Person First: Marlon Flanagan    1st Assistant Tasks: Assisting with operating room equipment    Anaesthesia: General     EBL: 50mL    Drains: 22 Fr 3 way catheter with 60 cc Balloon     Complications: None    DISPOSITION: Wean CBI over next 1-3 hours and then clamp. Give lasix, ambulate and if if no clots, home today with James.  VT tomorrow.       INDICATION:    Jaret Benites is a 84 y.o.male who has a history of BPH and bladder outlet obstruction.  He is on maximal medical therapy and remains symptomatic with previous bladder stones.  He has a 100 gm prostate and is here today for HoLEP and cystolithopaxy.    PROCEDURE  Patient underwent general anesthesia and was prepped and draped in the standard sterile fashion in dorsal lithotomy position. After appropriate pause and confirmation of antibiotic administration, the urethra was dilated to 30 Fr with [place] sounds.  The Caitlyn was used to enter the bladder and the laser scope was inserted.  Inspection was performed which revealed no tumors, trilobar hypertrophy and orthotopic ureteral orifices.  There was a 1cm bladder stone as well. This was lasered and the fragments removed.      En Bloc technique was utilized for this HoLEP.  On a setting of 2 J and 40 Hz an incision was made at the apex just lateral and proximal to the veru and carried across to both the left and right in

## 2024-07-24 NOTE — RESULT ENCOUNTER NOTE
Let him know just a tiny bit of PRE cancer in the HoLEP. But NOTHING to worry about.  Will plan on just making sure he gets PSA 1 week prior to visit with kriss.  We will just monitor this. No treatment needed. He is 84.

## 2025-03-18 ENCOUNTER — TRANSCRIBE ORDERS (OUTPATIENT)
Facility: HOSPITAL | Age: 85
End: 2025-03-18

## 2025-03-18 DIAGNOSIS — N18.32 CHRONIC KIDNEY DISEASE (CKD) STAGE G3B/A1, MODERATELY DECREASED GLOMERULAR FILTRATION RATE (GFR) BETWEEN 30-44 ML/MIN/1.73 SQUARE METER AND ALBUMINURIA CREATININE RATIO LESS THAN 30 MG/G (HCC): Primary | ICD-10-CM

## 2025-04-07 ENCOUNTER — HOSPITAL ENCOUNTER (OUTPATIENT)
Age: 85
Discharge: HOME | End: 2025-04-10
Attending: INTERNAL MEDICINE
Payer: MEDICARE

## 2025-04-07 DIAGNOSIS — N18.32 CHRONIC KIDNEY DISEASE (CKD) STAGE G3B/A1, MODERATELY DECREASED GLOMERULAR FILTRATION RATE (GFR) BETWEEN 30-44 ML/MIN/1.73 SQUARE METER AND ALBUMINURIA CREATININE RATIO LESS THAN 30 MG/G (HCC): ICD-10-CM

## 2025-04-07 PROCEDURE — 76770 US EXAM ABDO BACK WALL COMP: CPT

## 2025-06-09 PROBLEM — C61 PROSTATE CANCER (HCC): Status: ACTIVE | Noted: 2025-06-09

## 2025-06-09 PROBLEM — R97.20 ELEVATED PSA: Status: ACTIVE | Noted: 2025-06-09

## 2025-07-11 ENCOUNTER — OFFICE VISIT (OUTPATIENT)
Age: 85
End: 2025-07-11

## 2025-07-11 VITALS
BODY MASS INDEX: 21.67 KG/M2 | DIASTOLIC BLOOD PRESSURE: 62 MMHG | HEIGHT: 72 IN | SYSTOLIC BLOOD PRESSURE: 151 MMHG | HEART RATE: 63 BPM | WEIGHT: 160 LBS

## 2025-07-11 DIAGNOSIS — N40.1 ENLARGED PROSTATE WITH URINARY OBSTRUCTION: ICD-10-CM

## 2025-07-11 DIAGNOSIS — N20.0 STAGHORN CALCULUS: Primary | ICD-10-CM

## 2025-07-11 DIAGNOSIS — N18.32 STAGE 3B CHRONIC KIDNEY DISEASE (HCC): ICD-10-CM

## 2025-07-11 DIAGNOSIS — N20.0 STAGHORN CALCULUS: ICD-10-CM

## 2025-07-11 DIAGNOSIS — R97.20 ELEVATED PSA: ICD-10-CM

## 2025-07-11 DIAGNOSIS — N13.8 ENLARGED PROSTATE WITH URINARY OBSTRUCTION: ICD-10-CM

## 2025-07-11 DIAGNOSIS — C61 PROSTATE CANCER (HCC): ICD-10-CM

## 2025-07-11 LAB
BILIRUBIN, URINE, POC: NEGATIVE
BLOOD URINE, POC: ABNORMAL
GLUCOSE URINE, POC: NEGATIVE
KETONES, URINE, POC: NEGATIVE
LEUKOCYTE ESTERASE, URINE, POC: NEGATIVE
NITRITE, URINE, POC: NEGATIVE
PH, URINE, POC: 6 (ref 4.6–8)
PROTEIN,URINE, POC: 30
SPECIFIC GRAVITY, URINE, POC: 1.01 (ref 1–1.03)
URINALYSIS CLARITY, POC: CLEAR
URINALYSIS COLOR, POC: YELLOW
UROBILINOGEN, POC: ABNORMAL

## 2025-07-11 RX ORDER — HYDROCHLOROTHIAZIDE 12.5 MG/1
1 TABLET ORAL DAILY
COMMUNITY

## 2025-07-11 RX ORDER — HYDROXYZINE HYDROCHLORIDE 25 MG/1
1 TABLET, FILM COATED ORAL EVERY 8 HOURS PRN
COMMUNITY

## 2025-07-11 RX ORDER — ATORVASTATIN CALCIUM 20 MG/1
TABLET, FILM COATED ORAL
COMMUNITY

## 2025-07-11 RX ORDER — MEMANTINE HYDROCHLORIDE 10 MG/1
TABLET ORAL
COMMUNITY

## 2025-07-11 RX ORDER — SPIRONOLACTONE 25 MG/1
TABLET ORAL
COMMUNITY
Start: 2025-05-27

## 2025-07-11 RX ORDER — CHLORHEXIDINE GLUCONATE ORAL RINSE 1.2 MG/ML
SOLUTION DENTAL
COMMUNITY

## 2025-07-11 RX ORDER — TAMSULOSIN HYDROCHLORIDE 0.4 MG/1
CAPSULE ORAL
COMMUNITY

## 2025-07-11 NOTE — ASSESSMENT & PLAN NOTE
Diagnosed July 2024, pathologic T1a disease.  Given his age and stage, it is not of clinical significance at this time.  He understands that now.  I would follow the PSA over time.

## 2025-07-11 NOTE — ASSESSMENT & PLAN NOTE
Large right renal calculus.  Prior history of urosepsis last year.  Concern for chronically infected kidney stone.  We discussed a CT and PCNL.  The patient was counseled on the risks, benefits and expected course of surgery. Surgery has risks of bleeding, infection, injury, pain, death or other consequences. Perioperative medications and antibiotic use were discussed including the potential for reactions and side effects. Some specific risks of surgery were discussed as well.  We discussed a postop nephrostomy tube.  He wishes to proceed.

## 2025-07-11 NOTE — ASSESSMENT & PLAN NOTE
Last year his creatinine and EGFR was in the 3A range.  I do not have recent labs.  He is reportedly 3B currently.  Check labs.

## 2025-07-11 NOTE — PROGRESS NOTES
Chief Complaint   Patient presents with    New Patient    Nephrolithiasis     1. Have you been to the ER, urgent care clinic since your last visit?  Hospitalized since your last visit?No    2. Have you seen or consulted any other health care providers outside of the [facility] since your last visit?  Include any pap smears or colon screening. No    
have escaped final proofreading.  Thank you.

## 2025-07-12 LAB
APPEARANCE UR: CLEAR
BACTERIA #/AREA URNS HPF: ABNORMAL /[HPF]
BILIRUB UR QL STRIP: NEGATIVE
CASTS URNS QL MICRO: ABNORMAL /LPF
COLOR UR: YELLOW
EPI CELLS #/AREA URNS HPF: ABNORMAL /HPF (ref 0–10)
GLUCOSE UR QL STRIP: NEGATIVE
HGB UR QL STRIP: ABNORMAL
KETONES UR QL STRIP: NEGATIVE
LEUKOCYTE ESTERASE UR QL STRIP: NEGATIVE
MICRO URNS: ABNORMAL
NITRITE UR QL STRIP: NEGATIVE
PH UR STRIP: 6.5 [PH] (ref 5–7.5)
PROT UR QL STRIP: ABNORMAL
RBC #/AREA URNS HPF: ABNORMAL /HPF (ref 0–2)
SP GR UR STRIP: 1.02 (ref 1–1.03)
UROBILINOGEN UR STRIP-MCNC: 0.2 MG/DL (ref 0.2–1)
WBC #/AREA URNS HPF: ABNORMAL /HPF (ref 0–5)

## 2025-07-15 LAB
BUN SERPL-MCNC: 24 MG/DL (ref 8–27)
BUN/CREAT SERPL: 18 (ref 10–24)
CALCIUM SERPL-MCNC: 9.5 MG/DL (ref 8.6–10.2)
CHLORIDE SERPL-SCNC: 105 MMOL/L (ref 96–106)
CO2 SERPL-SCNC: 22 MMOL/L (ref 20–29)
CREAT SERPL-MCNC: 1.35 MG/DL (ref 0.76–1.27)
EGFRCR SERPLBLD CKD-EPI 2021: 51 ML/MIN/1.73
GLUCOSE SERPL-MCNC: 86 MG/DL (ref 70–99)
POTASSIUM SERPL-SCNC: 5.1 MMOL/L (ref 3.5–5.2)
PSA SERPL-MCNC: 1.2 NG/ML (ref 0–4)
SODIUM SERPL-SCNC: 143 MMOL/L (ref 134–144)

## 2025-07-28 ENCOUNTER — HOSPITAL ENCOUNTER (OUTPATIENT)
Facility: HOSPITAL | Age: 85
Discharge: HOME | End: 2025-07-31
Attending: UROLOGY
Payer: MEDICARE

## 2025-07-28 DIAGNOSIS — N20.0 STAGHORN CALCULUS: ICD-10-CM

## 2025-07-28 PROCEDURE — 74176 CT ABD & PELVIS W/O CONTRAST: CPT

## (undated) DEVICE — ROTATIONS-MORCELLATOR Ø 4.8MM WL 335MM  FOR MORCESCOPE, FOR MORCELLATION FOLLOWING LASER PROSTATE ENUCLEATION, STERILE: Brand: PIRANHA

## (undated) DEVICE — BAG DRAINAGE CUST DISP

## (undated) DEVICE — SKIN PREP TRAY 4 COMPARTM TRAY: Brand: MEDLINE INDUSTRIES, INC.

## (undated) DEVICE — GUIDEWIRE ENDOSCP L150CM DIA0.035IN TIP 3CM PTFE NIT

## (undated) DEVICE — STERILE LATEX POWDER-FREE SURGICAL GLOVESWITH NITRILE COATING: Brand: PROTEXIS

## (undated) DEVICE — SPONGE GZ W4XL4IN COT 12 PLY TYP VII WVN C FLD DSGN STERILE

## (undated) DEVICE — 4-PORT MANIFOLD: Brand: NEPTUNE 2

## (undated) DEVICE — SYRINGE MED 30ML STD CLR PLAS LUERLOCK TIP N CTRL DISP

## (undated) DEVICE — KIT OR TURNOVER

## (undated) DEVICE — SYRINGE MED 10ML LUERLOCK TIP W/O SFTY DISP

## (undated) DEVICE — SOLUTION IRRIG 1000ML 0.9% SOD CHL USP POUR PLAS BTL

## (undated) DEVICE — SNAP KOVER: Brand: UNBRANDED

## (undated) DEVICE — SOLUTION IRRIG 3000ML 0.9% SOD CHL FLX CONT 0797208] ICU MEDICAL INC]

## (undated) DEVICE — [FOUR SPIKE IRRIGATOR SET,  NON-PYROGENIC FLUID PATH,  DO NOT USE IF PACKAGE IS DAMAGED]

## (undated) DEVICE — STATLOCKTM STABILIZATION DEVICES (PICC PLUS, CRESCENT FOAM PAD, FIXED POST RETAINER): Brand: STATLOCK

## (undated) DEVICE — TUBE FOR SUCTION  PVC, PACK=10 PCS, STERILE, FOR SINGLE USE: Brand: PIRANHA

## (undated) DEVICE — TUBING, SUCTION, 3/16" X 12', STRAIGHT: Brand: MEDLINE

## (undated) DEVICE — UROLOGY SET

## (undated) DEVICE — TUBING IRRIG L77IN DIA0.241IN L BOR FOR CYSTO W/ NVENT

## (undated) DEVICE — PACK,CYSTOSCOPY,PK I,AURORA: Brand: MEDLINE

## (undated) DEVICE — LASER URETERAL CATHETER: Brand: COOK

## (undated) DEVICE — SYRINGE,TOOMEY,IRRIGATION,70CC,STERILE: Brand: MEDLINE